# Patient Record
Sex: FEMALE | Race: WHITE | NOT HISPANIC OR LATINO | Employment: FULL TIME | URBAN - METROPOLITAN AREA
[De-identification: names, ages, dates, MRNs, and addresses within clinical notes are randomized per-mention and may not be internally consistent; named-entity substitution may affect disease eponyms.]

---

## 2017-02-21 ENCOUNTER — HISTORICAL (OUTPATIENT)
Dept: LAB | Facility: HOSPITAL | Age: 32
End: 2017-02-21

## 2017-02-21 ENCOUNTER — HISTORICAL (OUTPATIENT)
Dept: PREADMISSION TESTING | Facility: HOSPITAL | Age: 32
End: 2017-02-21

## 2017-02-22 ENCOUNTER — HISTORICAL (OUTPATIENT)
Dept: SURGERY | Facility: HOSPITAL | Age: 32
End: 2017-02-22

## 2018-08-30 ENCOUNTER — HISTORICAL (OUTPATIENT)
Dept: ADMINISTRATIVE | Facility: HOSPITAL | Age: 33
End: 2018-08-30

## 2018-08-30 LAB
ABS NEUT (OLG): 8.5
ALBUMIN SERPL-MCNC: 4.3 GM/DL (ref 3.4–5)
ALBUMIN/GLOB SERPL: 1.39 {RATIO} (ref 1.5–2.5)
ALP SERPL-CCNC: 67 UNIT/L (ref 38–126)
ALT SERPL-CCNC: 16 UNIT/L (ref 7–52)
APPEARANCE, UA: CLEAR
AST SERPL-CCNC: 14 UNIT/L (ref 15–37)
BACTERIA #/AREA URNS AUTO: NORMAL /HPF
BILIRUB SERPL-MCNC: 0.3 MG/DL (ref 0.2–1)
BILIRUB UR QL STRIP: NEGATIVE MG/DL
BILIRUBIN DIRECT+TOT PNL SERPL-MCNC: 0.1 MG/DL (ref 0–0.5)
BILIRUBIN DIRECT+TOT PNL SERPL-MCNC: 0.2 MG/DL
BUN SERPL-MCNC: 10 MG/DL (ref 7–18)
CALCIUM SERPL-MCNC: 9.4 MG/DL (ref 8.5–10)
CHLORIDE SERPL-SCNC: 101 MMOL/L (ref 98–107)
CHOLEST SERPL-MCNC: 263 MG/DL (ref 0–200)
CHOLEST/HDLC SERPL: 5.5 {RATIO}
CO2 SERPL-SCNC: 28 MMOL/L (ref 21–32)
COLOR UR: YELLOW
CREAT SERPL-MCNC: 0.56 MG/DL (ref 0.6–1.3)
ERYTHROCYTE [DISTWIDTH] IN BLOOD BY AUTOMATED COUNT: 14 % (ref 11.5–17)
GLOBULIN SER-MCNC: 3.1 GM/DL (ref 1.2–3)
GLUCOSE (UA): NEGATIVE MG/DL
GLUCOSE SERPL-MCNC: 90 MG/DL (ref 74–106)
HCT VFR BLD AUTO: 39.4 % (ref 37–47)
HDLC SERPL-MCNC: 48 MG/DL (ref 35–60)
HGB BLD-MCNC: 12.7 GM/DL (ref 12–16)
HGB UR QL STRIP: NEGATIVE UNIT/L
KETONES UR QL STRIP: NEGATIVE MG/DL
LDLC SERPL CALC-MCNC: 169 MG/DL (ref 0–129)
LEUKOCYTE ESTERASE UR QL STRIP: NORMAL UNIT/L
LYMPHOCYTES # BLD AUTO: 2.5 X10(3)/MCL (ref 0.6–3.4)
LYMPHOCYTES NFR BLD AUTO: 20.9 % (ref 13–40)
MCH RBC QN AUTO: 29.5 PG (ref 27–31.2)
MCHC RBC AUTO-ENTMCNC: 32 GM/DL (ref 32–36)
MCV RBC AUTO: 91 FL (ref 80–94)
MONOCYTES # BLD AUTO: 0.8 X10(3)/MCL (ref 0–1.8)
MONOCYTES NFR BLD AUTO: 6.5 % (ref 0.1–24)
NEUTROPHILS NFR BLD AUTO: 72.6 % (ref 47–80)
NITRITE UR QL STRIP.AUTO: NEGATIVE
PH UR STRIP: 7 [PH]
PLATELET # BLD AUTO: 493 X10(3)/MCL (ref 130–400)
PMV BLD AUTO: 8.6 FL
POTASSIUM SERPL-SCNC: 4.4 MMOL/L (ref 3.5–5.1)
PROT SERPL-MCNC: 7.4 GM/DL (ref 6.4–8.2)
PROT UR QL STRIP: NEGATIVE MG/DL
RBC # BLD AUTO: 4.31 X10(6)/MCL (ref 4.2–5.4)
RBC #/AREA URNS HPF: NORMAL /HPF
SODIUM SERPL-SCNC: 136 MMOL/L (ref 136–145)
SP GR UR STRIP: 1.01
SQUAMOUS EPITHELIAL, UA: NORMAL /LPF
TRIGL SERPL-MCNC: 270 MG/DL (ref 30–150)
TSH SERPL-ACNC: 1.81 MIU/ML (ref 0.35–4.94)
UROBILINOGEN UR STRIP-ACNC: 0.2 MG/DL
VLDLC SERPL CALC-MCNC: 54 MG/DL
WBC # SPEC AUTO: 11.8 X10(3)/MCL (ref 4.5–11.5)
WBC #/AREA URNS AUTO: NORMAL /[HPF]

## 2020-01-24 LAB
INFLUENZA A ANTIGEN, POC: POSITIVE
INFLUENZA B ANTIGEN, POC: NEGATIVE

## 2020-04-30 ENCOUNTER — HISTORICAL (OUTPATIENT)
Dept: ADMINISTRATIVE | Facility: HOSPITAL | Age: 35
End: 2020-04-30

## 2020-04-30 ENCOUNTER — HISTORICAL (OUTPATIENT)
Dept: LAB | Facility: HOSPITAL | Age: 35
End: 2020-04-30

## 2020-04-30 LAB
ABS NEUT (OLG): 7.1 X10(3)/MCL (ref 2.1–9.2)
ALBUMIN SERPL-MCNC: 3.8 GM/DL (ref 3.5–5)
ALBUMIN/GLOB SERPL: 1.1 RATIO (ref 1.1–2)
ALP SERPL-CCNC: 81 UNIT/L (ref 40–150)
ALT SERPL-CCNC: 18 UNIT/L (ref 0–55)
AST SERPL-CCNC: 18 UNIT/L (ref 5–34)
BILIRUB SERPL-MCNC: 0.3 MG/DL
BILIRUBIN DIRECT+TOT PNL SERPL-MCNC: 0.1 MG/DL (ref 0–0.5)
BILIRUBIN DIRECT+TOT PNL SERPL-MCNC: 0.2 MG/DL (ref 0–0.8)
BUN SERPL-MCNC: 8 MG/DL (ref 7–18.7)
CALCIUM SERPL-MCNC: 9.5 MG/DL (ref 8.4–10.2)
CHLORIDE SERPL-SCNC: 100 MMOL/L (ref 98–107)
CHOLEST SERPL-MCNC: 288 MG/DL
CHOLEST/HDLC SERPL: 7 {RATIO} (ref 0–5)
CO2 SERPL-SCNC: 29 MMOL/L (ref 22–29)
CREAT SERPL-MCNC: 0.7 MG/DL (ref 0.55–1.02)
ERYTHROCYTE [DISTWIDTH] IN BLOOD BY AUTOMATED COUNT: 13.8 % (ref 11.5–17)
GLOBULIN SER-MCNC: 3.4 GM/DL (ref 2.4–3.5)
GLUCOSE SERPL-MCNC: 86 MG/DL (ref 74–100)
HCT VFR BLD AUTO: 40 % (ref 37–47)
HDLC SERPL-MCNC: 40 MG/DL (ref 35–60)
HGB BLD-MCNC: 12.9 GM/DL (ref 12–16)
LDLC SERPL CALC-MCNC: 199 MG/DL (ref 50–140)
LYMPHOCYTES # BLD AUTO: 2.5 X10(3)/MCL (ref 0.6–3.4)
LYMPHOCYTES NFR BLD AUTO: 23.3 % (ref 13–40)
MCH RBC QN AUTO: 28.6 PG (ref 27–31.2)
MCHC RBC AUTO-ENTMCNC: 32 GM/DL (ref 32–36)
MCV RBC AUTO: 89 FL (ref 80–94)
MONOCYTES # BLD AUTO: 1.1 X10(3)/MCL (ref 0.1–1.3)
MONOCYTES NFR BLD AUTO: 10.6 % (ref 0.1–24)
NEUTROPHILS NFR BLD AUTO: 66.1 % (ref 47–80)
PLATELET # BLD AUTO: 536 X10(3)/MCL (ref 130–400)
PMV BLD AUTO: 9.1 FL (ref 9.4–12.4)
POTASSIUM SERPL-SCNC: 4.6 MMOL/L (ref 3.5–5.1)
PROT SERPL-MCNC: 7.2 GM/DL (ref 6.4–8.3)
RBC # BLD AUTO: 4.51 X10(6)/MCL (ref 4.2–5.4)
SODIUM SERPL-SCNC: 136 MMOL/L (ref 136–145)
TRIGL SERPL-MCNC: 243 MG/DL (ref 37–140)
TSH SERPL-ACNC: 1.33 MIU/ML (ref 0.35–4.94)
VLDLC SERPL CALC-MCNC: 49 MG/DL
WBC # SPEC AUTO: 10.7 X10(3)/MCL (ref 4.5–11.5)

## 2021-01-07 ENCOUNTER — HISTORICAL (OUTPATIENT)
Dept: ADMINISTRATIVE | Facility: HOSPITAL | Age: 36
End: 2021-01-07

## 2021-01-07 LAB
ABS NEUT (OLG): 7.3 X10(3)/MCL (ref 2.1–9.2)
ALBUMIN SERPL-MCNC: 4.4 GM/DL (ref 3.4–5)
ALBUMIN/GLOB SERPL: 1.69 {RATIO} (ref 1.5–2.5)
ALP SERPL-CCNC: 66 UNIT/L (ref 38–126)
ALT SERPL-CCNC: 13 UNIT/L (ref 7–52)
APPEARANCE, UA: CLEAR
AST SERPL-CCNC: 12 UNIT/L (ref 15–37)
BACTERIA #/AREA URNS AUTO: NORMAL /HPF
BILIRUB SERPL-MCNC: 0.3 MG/DL (ref 0.2–1)
BILIRUB UR QL STRIP: NEGATIVE MG/DL
BILIRUBIN DIRECT+TOT PNL SERPL-MCNC: 0.1 MG/DL (ref 0–0.5)
BILIRUBIN DIRECT+TOT PNL SERPL-MCNC: 0.2 MG/DL
BUN SERPL-MCNC: 10 MG/DL (ref 7–18)
CALCIUM SERPL-MCNC: 9.6 MG/DL (ref 8.5–10)
CHLORIDE SERPL-SCNC: 102 MMOL/L (ref 98–107)
CHOLEST SERPL-MCNC: 253 MG/DL (ref 0–200)
CHOLEST/HDLC SERPL: 6.3 {RATIO}
CO2 SERPL-SCNC: 27 MMOL/L (ref 21–32)
COLOR UR: YELLOW
CREAT SERPL-MCNC: 0.71 MG/DL (ref 0.6–1.3)
ERYTHROCYTE [DISTWIDTH] IN BLOOD BY AUTOMATED COUNT: 13.8 % (ref 11.5–17)
GLOBULIN SER-MCNC: 2.6 GM/DL (ref 1.2–3)
GLUCOSE (UA): NEGATIVE MG/DL
GLUCOSE SERPL-MCNC: 91 MG/DL (ref 74–106)
HCT VFR BLD AUTO: 39.8 % (ref 37–47)
HDLC SERPL-MCNC: 40 MG/DL (ref 35–60)
HGB BLD-MCNC: 13 GM/DL (ref 12–16)
HGB UR QL STRIP: NEGATIVE UNIT/L
KETONES UR QL STRIP: NEGATIVE MG/DL
LDLC SERPL CALC-MCNC: 201 MG/DL (ref 0–129)
LEUKOCYTE ESTERASE UR QL STRIP: NEGATIVE UNIT/L
LYMPHOCYTES # BLD AUTO: 2.1 X10(3)/MCL (ref 0.6–3.4)
LYMPHOCYTES NFR BLD AUTO: 20.9 % (ref 13–40)
MCH RBC QN AUTO: 28.8 PG (ref 27–31.2)
MCHC RBC AUTO-ENTMCNC: 33 GM/DL (ref 32–36)
MCV RBC AUTO: 88 FL (ref 80–94)
MONOCYTES # BLD AUTO: 0.8 X10(3)/MCL (ref 0.1–1.3)
MONOCYTES NFR BLD AUTO: 7.7 % (ref 0.1–24)
NEUTROPHILS NFR BLD AUTO: 71.4 % (ref 47–80)
NITRITE UR QL STRIP.AUTO: NEGATIVE
PH UR STRIP: 7 [PH]
PLATELET # BLD AUTO: 533 X10(3)/MCL (ref 130–400)
PMV BLD AUTO: 9 FL (ref 9.4–12.4)
POTASSIUM SERPL-SCNC: 4.1 MMOL/L (ref 3.5–5.1)
PROT SERPL-MCNC: 7 GM/DL (ref 6.4–8.2)
PROT UR QL STRIP: NEGATIVE MG/DL
RBC # BLD AUTO: 4.52 X10(6)/MCL (ref 4.2–5.4)
RBC #/AREA URNS HPF: NORMAL /HPF
SODIUM SERPL-SCNC: 138 MMOL/L (ref 136–145)
SP GR UR STRIP: 1.02
SQUAMOUS EPITHELIAL, UA: NORMAL /LPF
TRIGL SERPL-MCNC: 163 MG/DL (ref 30–150)
TSH SERPL-ACNC: 0.82 MIU/ML (ref 0.35–4.94)
UROBILINOGEN UR STRIP-ACNC: 0.2 MG/DL
VLDLC SERPL CALC-MCNC: 32.6 MG/DL
WBC # SPEC AUTO: 10.2 X10(3)/MCL (ref 4.5–11.5)
WBC #/AREA URNS AUTO: NORMAL /[HPF]

## 2021-11-20 ENCOUNTER — HISTORICAL (OUTPATIENT)
Dept: ADMINISTRATIVE | Facility: HOSPITAL | Age: 36
End: 2021-11-20

## 2021-11-20 LAB
ALBUMIN SERPL-MCNC: 3.4 GM/DL (ref 3.5–5)
ALBUMIN/GLOB SERPL: 1.2 RATIO (ref 1.1–2)
ALP SERPL-CCNC: 68 UNIT/L (ref 40–150)
ALT SERPL-CCNC: 24 UNIT/L (ref 0–55)
AMPHET UR QL SCN: POSITIVE
APPEARANCE, UA: CLEAR
AST SERPL-CCNC: 19 UNIT/L (ref 5–34)
B-HCG SERPL QL: NEGATIVE
BACTERIA SPEC CULT: ABNORMAL
BARBITURATE SCN PRESENT UR: NEGATIVE
BENZODIAZ UR QL SCN: NEGATIVE
BILIRUB SERPL-MCNC: 0.2 MG/DL
BILIRUB UR QL STRIP: NEGATIVE
BILIRUBIN DIRECT+TOT PNL SERPL-MCNC: <0.1 MG/DL (ref 0–0.5)
BILIRUBIN DIRECT+TOT PNL SERPL-MCNC: >0.1 MG/DL (ref 0–0.8)
BUN SERPL-MCNC: 10.4 MG/DL (ref 7–18.7)
CALCIUM SERPL-MCNC: 9.3 MG/DL (ref 8.7–10.5)
CANNABINOIDS UR QL SCN: NEGATIVE NG/ML
CHLORIDE SERPL-SCNC: 107 MMOL/L (ref 98–107)
CHOLEST SERPL-MCNC: 215 MG/DL
CHOLEST/HDLC SERPL: 5 {RATIO} (ref 0–5)
CO2 SERPL-SCNC: 25 MMOL/L (ref 22–29)
COCAINE UR QL SCN: NEGATIVE
COLOR UR: YELLOW
CREAT SERPL-MCNC: 0.72 MG/DL (ref 0.55–1.02)
ERYTHROCYTE [DISTWIDTH] IN BLOOD BY AUTOMATED COUNT: 14.1 % (ref 11.5–17)
GLOBULIN SER-MCNC: 2.9 GM/DL (ref 2.4–3.5)
GLUCOSE (UA): NEGATIVE
GLUCOSE SERPL-MCNC: 88 MG/DL (ref 74–100)
HCT VFR BLD AUTO: 38 % (ref 37–47)
HDLC SERPL-MCNC: 43 MG/DL (ref 35–60)
HGB BLD-MCNC: 12 GM/DL (ref 12–16)
HGB UR QL STRIP: NEGATIVE
KETONES UR QL STRIP: NEGATIVE
LDLC SERPL CALC-MCNC: 151 MG/DL (ref 50–140)
LEUKOCYTE ESTERASE UR QL STRIP: NEGATIVE
MCH RBC QN AUTO: 28.8 PG (ref 27–31)
MCHC RBC AUTO-ENTMCNC: 31.6 GM/DL (ref 33–36)
MCV RBC AUTO: 91.1 FL (ref 80–94)
NITRITE UR QL STRIP: NEGATIVE
OPIATES UR QL SCN: NEGATIVE
PCP UR QL: NEGATIVE
PH UR STRIP.AUTO: 6 [PH] (ref 5–7.5)
PH UR STRIP: 6 [PH] (ref 5–9)
PLATELET # BLD AUTO: 440 X10(3)/MCL (ref 130–400)
PMV BLD AUTO: 9.7 FL (ref 9.4–12.4)
POTASSIUM SERPL-SCNC: 4.8 MMOL/L (ref 3.5–5.1)
PROT SERPL-MCNC: 6.3 GM/DL (ref 6.4–8.3)
PROT UR QL STRIP: NEGATIVE
RBC # BLD AUTO: 4.17 X10(6)/MCL (ref 4.2–5.4)
RBC #/AREA URNS HPF: ABNORMAL /[HPF]
SODIUM SERPL-SCNC: 141 MMOL/L (ref 136–145)
SP GR FLD REFRACTOMETRY: 1.02 (ref 1–1.03)
SP GR UR STRIP: 1.02 (ref 1–1.03)
SQUAMOUS EPITHELIAL, UA: ABNORMAL
TRIGL SERPL-MCNC: 103 MG/DL (ref 37–140)
TSH SERPL-ACNC: 1.38 UIU/ML (ref 0.35–4.94)
UROBILINOGEN UR STRIP-ACNC: 0.2
VLDLC SERPL CALC-MCNC: 21 MG/DL
WBC # SPEC AUTO: 8.5 X10(3)/MCL (ref 4.5–11.5)
WBC #/AREA URNS HPF: ABNORMAL /HPF

## 2022-04-10 ENCOUNTER — HISTORICAL (OUTPATIENT)
Dept: ADMINISTRATIVE | Facility: HOSPITAL | Age: 37
End: 2022-04-10
Payer: COMMERCIAL

## 2022-04-20 ENCOUNTER — OFFICE VISIT (OUTPATIENT)
Dept: INTERNAL MEDICINE | Facility: CLINIC | Age: 37
End: 2022-04-20
Payer: COMMERCIAL

## 2022-04-20 VITALS
SYSTOLIC BLOOD PRESSURE: 124 MMHG | HEIGHT: 64 IN | DIASTOLIC BLOOD PRESSURE: 82 MMHG | BODY MASS INDEX: 34.06 KG/M2 | OXYGEN SATURATION: 97 % | TEMPERATURE: 99 F | WEIGHT: 199.5 LBS | HEART RATE: 108 BPM

## 2022-04-20 DIAGNOSIS — E66.9 OBESITY (BMI 30-39.9): ICD-10-CM

## 2022-04-20 DIAGNOSIS — F90.9 ATTENTION DEFICIT HYPERACTIVITY DISORDER (ADHD), UNSPECIFIED ADHD TYPE: ICD-10-CM

## 2022-04-20 DIAGNOSIS — D22.9 CHANGE IN SKIN MOLE: ICD-10-CM

## 2022-04-20 DIAGNOSIS — F32.9 MAJOR DEPRESSIVE DISORDER WITH SINGLE EPISODE, REMISSION STATUS UNSPECIFIED: ICD-10-CM

## 2022-04-20 DIAGNOSIS — F41.1 ANXIETY STATE: Primary | ICD-10-CM

## 2022-04-20 PROBLEM — U07.1 COVID-19: Status: ACTIVE | Noted: 2022-01-17

## 2022-04-20 PROCEDURE — 99999 PR PBB SHADOW E&M-EST. PATIENT-LVL IV: ICD-10-PCS | Mod: PBBFAC,,,

## 2022-04-20 PROCEDURE — 99204 PR OFFICE/OUTPT VISIT, NEW, LEVL IV, 45-59 MIN: ICD-10-PCS | Mod: S$GLB,,,

## 2022-04-20 PROCEDURE — 99999 PR PBB SHADOW E&M-EST. PATIENT-LVL IV: CPT | Mod: PBBFAC,,,

## 2022-04-20 PROCEDURE — 99204 OFFICE O/P NEW MOD 45 MIN: CPT | Mod: S$GLB,,,

## 2022-04-20 RX ORDER — ESCITALOPRAM OXALATE 10 MG/1
10 TABLET ORAL DAILY
COMMUNITY
Start: 2022-03-16 | End: 2022-04-20

## 2022-04-20 RX ORDER — ESCITALOPRAM OXALATE 10 MG/1
10 TABLET ORAL DAILY
Qty: 90 TABLET | Refills: 2 | Status: SHIPPED | OUTPATIENT
Start: 2022-04-20 | End: 2022-05-09 | Stop reason: SDUPTHER

## 2022-04-20 RX ORDER — OXCARBAZEPINE 300 MG/1
300 TABLET, FILM COATED ORAL 2 TIMES DAILY
Qty: 60 TABLET | Refills: 6 | Status: SHIPPED | OUTPATIENT
Start: 2022-04-20 | End: 2022-05-09 | Stop reason: SDUPTHER

## 2022-04-20 RX ORDER — FLUTICASONE PROPIONATE 50 MCG
SPRAY, SUSPENSION (ML) NASAL
COMMUNITY
Start: 2022-01-17 | End: 2022-05-09

## 2022-04-20 RX ORDER — DEXTROAMPHETAMINE SACCHARATE, AMPHETAMINE ASPARTATE, DEXTROAMPHETAMINE SULFATE AND AMPHETAMINE SULFATE 5; 5; 5; 5 MG/1; MG/1; MG/1; MG/1
1 TABLET ORAL 2 TIMES DAILY
COMMUNITY
Start: 2021-12-09 | End: 2022-04-20

## 2022-04-20 RX ORDER — OXCARBAZEPINE 300 MG/1
300 TABLET, FILM COATED ORAL 2 TIMES DAILY
COMMUNITY
Start: 2022-03-16 | End: 2022-04-20 | Stop reason: SDUPTHER

## 2022-04-20 NOTE — PROGRESS NOTES
Leyla Brock  04/20/2022  [id number]    No primary care provider on file.  No care team member to display  Has the patient seen any provider outside of the [facility] network since the last visit? (yes). If yes, HIPPA forms completed and records requested.        Visit Type:an urgent visit for a new problem    Chief Complaint:  Chief Complaint   Patient presents with    Medication Refill       History of Present Illness:    Recently moved to  from [place]  Her wife recently got a new job in      ADHD  Was on adderall   Did not like the effects  Last med refill was 12/2021  Was seeing Sydnie Haile,  for med management   At this time does not think she needs to continue meds for ADHD       Was on Wellbutrin and adderall together  Not sure if the combination of the drugs were making her fell off    Taking lexapro and trileptal   Anxiety and depression are well controlled  Denies SI or HI thoughts  Was seeing Lyric camryn for med management          History:  No past medical history on file.  No past surgical history on file.  No family history on file.  Social History     Socioeconomic History    Marital status:      There is no problem list on file for this patient.    Review of patient's allergies indicates:  Not on File    The following were reviewed at this visit: active problem list, medication list, allergies, family history, social history, and health maintenance.    Medications:  No current outpatient medications on file prior to visit.     No current facility-administered medications on file prior to visit.       Medications have been reviewed and reconciled with patient at this visit.  Barriers to medications reviewed with patient.    Adverse reactions to current medications reviewed with patient..    Over the counter medications reviewed and reconciled with patient.    Exam:  Wt Readings from Last 3 Encounters:   No data found for Wt     Temp Readings from Last 3 Encounters:   No data  found for Temp     BP Readings from Last 3 Encounters:   No data found for BP     Pulse Readings from Last 3 Encounters:   No data found for Pulse     There is no height or weight on file to calculate BMI.      Review of Systems   Constitutional: Negative.  Negative for chills and fever.   HENT: Negative.  Negative for congestion, sinus pain and sore throat.    Eyes: Negative for blurred vision and double vision.   Respiratory: Negative for cough, sputum production, shortness of breath and wheezing.    Cardiovascular: Negative for chest pain, palpitations and leg swelling.   Gastrointestinal: Negative for abdominal pain, constipation, diarrhea, heartburn, nausea and vomiting.   Genitourinary: Negative.    Musculoskeletal: Negative.    Skin: Negative.  Negative for rash.   Neurological: Negative.    Endo/Heme/Allergies: Negative.  Negative for polydipsia. Does not bruise/bleed easily.   Psychiatric/Behavioral: Negative for depression and substance abuse.     Physical Exam  Vitals and nursing note reviewed.   Constitutional:       General: She is not in acute distress.     Appearance: She is well-developed. She is obese. She is not diaphoretic.   HENT:      Head: Normocephalic and atraumatic.      Right Ear: External ear normal.      Left Ear: External ear normal.      Nose: Nose normal.   Eyes:      General:         Right eye: No discharge.         Left eye: No discharge.      Conjunctiva/sclera: Conjunctivae normal.      Pupils: Pupils are equal, round, and reactive to light.   Neck:      Thyroid: No thyromegaly.   Cardiovascular:      Rate and Rhythm: Normal rate and regular rhythm.      Pulses: Normal pulses.      Heart sounds: Normal heart sounds. No murmur heard.  Pulmonary:      Effort: Pulmonary effort is normal. No respiratory distress.      Breath sounds: Normal breath sounds. No wheezing.   Abdominal:      General: Bowel sounds are normal.   Musculoskeletal:         General: Normal range of motion.       Cervical back: Normal range of motion and neck supple.   Lymphadenopathy:      Cervical: No cervical adenopathy.   Skin:     General: Skin is warm and dry.      Capillary Refill: Capillary refill takes less than 2 seconds.   Neurological:      Mental Status: She is alert and oriented to person, place, and time.      Cranial Nerves: No cranial nerve deficit.   Psychiatric:         Behavior: Behavior normal.         Thought Content: Thought content normal.         Judgment: Judgment normal.         Laboratory Reviewed ({N/A)  No results found for: WBC, HGB, HCT, PLT, CHOL, TRIG, HDL, LDLDIRECT, ALT, AST, NA, K, CL, CREATININE, BUN, CO2, TSH, PSA, INR, GLUF, HGBA1C, MICROALBUR    Leyla was seen today for medication refill.    Diagnoses and all orders for this visit:    Anxiety state  -     EScitalopram oxalate (LEXAPRO) 10 MG tablet; Take 1 tablet (10 mg total) by mouth once daily.  -     OXcarbazepine (TRILEPTAL) 300 MG Tab; Take 1 tablet (300 mg total) by mouth 2 (two) times daily.    Change in skin mole  -     Ambulatory referral/consult to Dermatology; Future    Major depressive disorder with single episode, remission status unspecified  -     EScitalopram oxalate (LEXAPRO) 10 MG tablet; Take 1 tablet (10 mg total) by mouth once daily.  -     OXcarbazepine (TRILEPTAL) 300 MG Tab; Take 1 tablet (300 mg total) by mouth 2 (two) times daily.    Attention deficit hyperactivity disorder (ADHD), unspecified ADHD type   Currently stable at this time  Obesity (BMI 30-39.9)    Will schedule an annual/est care appt with Dr. Cruz        Care Plan/Goals: Reviewed    Goals    None         Follow up: No follow-ups on file.    After visit summary was printed and given to patient upon discharge today.  Patient goals and care plan are included in After Visit Summary.

## 2022-04-25 VITALS
DIASTOLIC BLOOD PRESSURE: 81 MMHG | OXYGEN SATURATION: 99 % | BODY MASS INDEX: 31.27 KG/M2 | HEIGHT: 64 IN | SYSTOLIC BLOOD PRESSURE: 119 MMHG | WEIGHT: 183.19 LBS

## 2022-05-03 NOTE — HISTORICAL [facility]ER
This is a historical note converted from [employer]. Formatting and pictures may have been removed.  Please reference Shira for original formatting and attached multimedia. Chief Complaint  wellness  History of Present Illness  The patient is a 32 year old white female here today for a complete Wellness physical.? The patient?has?1 acute complaints today. The patient also carries a diagnosis of?mood disorder NOS/tobacco use/carbuncles, which is assessed today.? Exercise is reported as minimal and includes?work activities.?? Diet modifications are reported as, none.?? Previous documented weight is recorded? as previous.  The patient also reports taking her venlafaxine 100% compliance?but is requesting a elevated dose secondary to some breakthrough symptoms?of dysthymia/anxiety.? No suicidal or homicidal ideations.? She is concerned about her weight?and?is attempting to lose weight through lifestyle modifications and exercise.? 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 She also complains of some?boils near her?waistline and in her bilateral inguinal region.  Review of Systems  Constitutional:?no weight gain,?no weight loss,?no fatigue,?no fever,?no chills,?no weakness,?no trouble sleeping.  Eyes:?no vision loss/changes,?no glasses or contacts,?no pain,?no redness,?no blurry or double vision,?no flashing lights,?no specks,?no glaucoma,?no cataracts.  Last eye exam:?within last 6 months  Head:?no headache,?no head injury,?no neck pain.?  Neck:??no lumps,?no swollen glands,?no stiffness.  Ears:?no decreased hearing,?no ringing,?no earache,?no drainage.?  Nose:?no stuffiness,?no discharge,?no itching,?no hay fever,?no nosebleeds,?no sinus pain.  Throat:?no bleeding,?no dentures,?no sore tongue,?no dry mouth,?no sore throat,?no hoarseness,?no thrush,?no non-healing sores.  Cardiovascular:?no chest pain or discomfort,?no tightness,?no palpitations,?no SOB with  activity,?no difficulty breathing while supine,?no swelling,?no sudden awakening from sleep with SOB.  Vascular:?no calf pain with walking,?no leg cramping.  Respiratory:??no cough,?no sputum,?no coughing up blood,?no SOB,?no wheezing,?no painful breathing.  Gastrointestinal:?no swallowing difficulty,?no heartburn,?no change in appetite,?no nausea,?no change in bowel habits,?no rectal bleeding,?no constipation,?no diarrhea,?no yellow eyes or skin.  Urinary:?no frequency,?no urgency,?no burning or pain,?no blood in urine,?no incontinence,?no change in urinary strength.  Musculoskeletal:?no muscle or joint pain,?no stiffness,?no back pain,?no redness of joints,?no swelling of joints,?no trauma.  Skin:?rashes,?lumps,?no itching,?no dryness,?color normal for ethnicity,?no hair or nail changes.? Primarily near the waistline and bilateral inguinal region  Neurologic:?no dizziness,?no fainting,?no seizures,?no weakness,?no numbness,?no tingling,?no tremors.  Psychiatric:?nervousness,?stress,?no depression,?no memory loss.  Endocrine:?no heat or cold intolerance,?no sweating,?no frequent urination,?no thirst,?no change in appetite.  Hematologic:?no ease of bruising,?no ease of bleeding.  ?  ?  ?  ?  Physical Exam  Vitals & Measurements  BP:?120/76?  HT:?163?cm? HT:?163?cm? WT:?94.7?kg? WT:?94.7?kg? BMI:?35.64?  VITAL SIGNS:? Reviewed.? ?  GENERAL:? In?no apparent distress.? Alert and Oriented x3  HEAD:?No signsof head trauma. Normocephalic  EYES:? Pupils?equal/round/reactive.? Extraocular motionsintact.  EARS:? Hearing?grossly intact. TMs and EAC?clear  MOUTH:??Oropharynx is clear.?No erythema. No exudates  NECK:? No LAD. No JVD. No thyromegaly. No bruits  CHEST:? Chest with clear breath sounds bilaterally.? No wheezes, rales, or rhonchi. Good air movement  CARDIAC:? Regular rate and rhythm.? S1 and S2, without murmurs, gallops, or rubs.  VASCULAR:??No Edema.? Peripheral pulses normal and equal in all  extremities.  ABDOMEN:?Soft, without detectable tenderness.??No sign of distention.?No rebound or guarding, and no masses palpated.? ?Bowel Sounds present and normal x 4.  MUSCULOSKELETAL:??Good range of motion of all major joints.?5/5 strength throughout. Extremities without clubbing, cyanosis or edema.  NEUROLOGIC EXAM:? Alert and oriented x 3.? No focal sensory or strength deficits.? ?Speech normal.? Follows commands.  PSYCHIATRIC:? Mood normal.? Linear/lucid/mildly anxious/normal affect/no internal stimuli response/MMSE essentially normal.  SKIN:??No rash or lesions.? Multiple isolated areas of folliculitis with carbuncles. ?No fluctuance. ?No discharge.  Assessment/Plan  1.?Wellness examination  ?Assessment/Plan:  ?   1.?Wellness  -Health and Exercise Prescription issued and educated upon  -10% weight loss goal  -Lifestyle counseling >20minutes  -Diet:?Low-carb/low volume  -Screening:?Up-to-date/GYN provider?has her up-to-date for breast/Pap  -Vaccines:?Tdap vaccine today/patient defers flu vaccine after discussion-wrist benefits discussed  -Labs:?See below  ?   2.Comorbidities:?See below  ?   3. Referrals:?None  ?   4. RTC:?12 month wellness  ?  Ordered:  CBC w/ Auto Diff, Routine collect, 08/30/18 9:51:00 CDT, Blood, Order for future visit, Stop date 08/30/18 9:51:00 CDT, Lab Collect, Wellness examination, 08/30/18 9:51:00 CDT  Clinic Follow up, *Est. 08/30/19 3:00:00 CDT, Wellness, Order for future visit, Wellness examination, [facility]  Comprehensive Metabolic Panel, Routine collect, 08/30/18 9:51:00 CDT, Blood, Order for future visit, Stop date 08/30/18 9:51:00 CDT, Lab Collect, Wellness examination, 08/30/18 9:51:00 CDT  Lab Collection Request, 08/30/18 9:51:00 CDT, [facility] AMB - [facility], 08/30/18 9:51:00 CDT  Lipid Panel, Routine collect, *Est. 08/30/18 3:00:00 CDT, Blood, Order for future visit, *Est. Stop date 08/30/18 3:00:00 CDT, Lab Collect, Wellness examination, 08/30/18 9:51:00 CDT  Preventative Health  Care Est 18-39 years [id number] PC, Wellness examination, INK AMB - [facility], 08/30/18 9:51:00 CDT  Thyroid Stimulating Hormone, Routine collect, 08/30/18 9:51:00 CDT, Blood, Order for future visit, Stop date 08/30/18 9:51:00 CDT, Lab Collect, Wellness examination, 08/30/18 9:51:00 CDT  Urinalysis Complete no reflex, Routine collect, Urine, Order for future visit, 08/30/18 9:51:00 CDT, Stop date 08/30/18 9:51:00 CDT, Nurse collect, Wellness examination  ?  2.?Mild mood disorder  ?-long discussion regarding?mood/no suicidal or homicidal ideations-but breakthrough on some?dysthymia and anxiety  -Increase venlafaxine to 75 mg extended release daily #30 and 11 refills  -The patient will call us with efficacy and/or side effects  -ER precautions for any suicidal or homicidal ideations-none current or in the past  -Greater than 50% of office visit spent counseling on?diagnosis/pathophysiology/follow-up/prognosis  -[facility]ocate counselor?once again if indicated-patient will pursue  Ordered:  venlafaxine, 75 mg = 1 cap(s), Oral, Daily, # 30 cap(s), 11 Refill(s), Pharmacy: [facility] [id number]  Clinic Follow up, *Est. 02/28/19 3:00:00 CST, Order for future visit, Mild mood disorder  Tobacco user, ink AFP  ?  3.?Tobacco user  ?-long discussion regarding cessation/patient is a contemplator  -Educated on use of?patches starting with 21 mg then 40 mg and 70 mg?along with?a cinnamon toothpick for all fixation-patient will consider and try a trial  -Greater than 50% of this time spent counseling on disease pathology/treatment  Ordered:  Clinic Follow up, *Est. 02/28/19 3:00:00 CST, Order for future visit, Mild mood disorder  Tobacco user, ink AFP  Smoke-Tobacco Cnsl 3-10M - Stat [id number] PC, Tobacco user, [facility] AMB - [facility], 08/30/18 9:53:00 CDT  ?  4.?Carbuncle  ?-Hibiclens to the area 1-2 times a day  -Mupirocin twice daily ?10 days upon flare  -No indication for I&D today  Ordered:  mupirocin topical, 1 gwen, TOP, BID, X 10 day(s), #  22 gm, 1 Refill(s), Pharmacy: [id number]  ?  Need for tetanus booster  ?-Tdap today  Ordered:  tetanus/diphth/pertuss (Tdap) adult/adol, 0.5 mL, IM, Once-Unscheduled, first dose 08/30/18 9:51:00 CDT  ?   Problem List/Past Medical History  Ongoing  Cyst - pilonidal  Hyperlipidemia  Mild mood disorder  Obesity  Tobacco user  Historical  Anxiety  Depression  Procedure/Surgical History  Excision of Buttock Subcutaneous Tissue and Fascia, Open Approach (02/22/2017)  Excision of pilonidal cyst or sinus; extensive (02/22/2017)  Pilonidal Cystectomy (None) (02/22/2017)  Esophagogastrectomy (2012)  BREAST REDUCTION  Pap smear for cervical cancer screening 27-APR-2016 23:33:46<$>  Tonsillectomy and adenoidectomy  wisdom teeth removed   Medications  mupirocin 2% topical ointment, 1 gwen, TOP, BID, 1 refills  venlafaxine 75 mg oral capsule, extended release, 75 mg= 1 cap(s), Oral, Daily, 11 refills  Allergies  No Known Allergies  Social History  Alcohol  Current, 03/27/2018  Current, Beer, 12/18/2015  Substance Abuse  Never, 12/18/2015  Tobacco  Current every day smoker, 03/27/2018  Current every day smoker, Cigarettes, 12/18/2015  Family History  CAD (coronary artery disease): Mother.  Hypertension.: Mother.  Immunizations  Vaccine Date Status   tetanus/diphtheria/pertussis, acel(Tdap) 08/30/2018 Given   Health Maintenance  Health Maintenance  ???Pending?(in the next year)  ??? ??OverDue  ??? ? ? ?Depression Screening due??06/07/17??and every 1??year(s)  ??? ? ? ?Hypertension Management-BMP due??02/21/18??and every 1??year(s)  ??? ??Due?  ??? ? ? ?Alcohol Misuse Screening due??08/30/18??and every 1??year(s)  ??? ? ? ?Diabetes Screening due??08/30/18??and every?  ??? ? ? ?Influenza Vaccine due??08/30/18??and every?  ??? ??Due In Future?  ??? ? ? ?Smoking Cessation not due until??03/27/19??and every 1??year(s)  ???Satisfied?(in the past 1 year)  ??? ??Satisfied?  ??? ? ? ?Blood Pressure Screening  on??08/30/18.??Satisfied by Chica Groves  ??? ? ? ?Body Mass Index Check on??08/30/18.??Satisfied by Chica Groves  ??? ? ? ?Cervical Cancer Screening on??11/21/17.??Satisfied by Johanna Tee  ??? ? ? ?Diabetes Screening on??08/30/18.??Satisfied by Jude Latham MD  ??? ? ? ?Hypertension Management-Blood Pressure on??08/30/18.??Satisfied by Chica Groves  ??? ? ? ?Obesity Screening on??08/30/18.??Satisfied by Chica Groves  ??? ? ? ?Smoking Cessation on??03/27/18.??Satisfied by Cely Leggett  ??? ? ? ?Tetanus Vaccine on??08/30/18.??Satisfied by Chica Groves  ?  ?

## 2022-05-03 NOTE — HISTORICAL [facility]
This is a historical note converted from [employer]. Formatting and pictures may have been removed.  Please reference [employer] for original formatting and attached multimedia. Chief Complaint  wellness  History of Present Illness  The patient is a?35 year old?female here today for a complete Wellness Physical exam. The patient has?no acute complaints today. The patient also carries a diagnosis of mentioned, which is assessed today. Exercise is reported as staying active with the kids. Monitors diet on a daily basis. Previous documented weight at last office visit is?201- she has some weight loss.??She states she was training with a  prior to the kids and has been able to keep the weight off with monitoring her diet and staying so busy.  She did reach out to psych for further ADHD testing and is followed by a psych provider Sydnie Sin and overall has been tolerating well. Since restarting Adderall she has decreased her Effexor to 37.5mg and overall has been tolerating this dose well without any issues or concerns. States mood has been stable and controlled, no depression, no s/ideation.  She has not had a recent pap in several years and plans to contact Dr. Edwards office to get apt made.- I enc the importance of this.  She is also currently still smoking,?she has cut back and is now at a half a pack per day?and defers?medication to help?with quitting at this time, I congratulated her on her decrease of use and encouraged her to continue?until she is?completely?tobacco-free  ?   GABBY [address] GYN  Review of Systems  Constitutional:?no weight gain,?weight loss,?no fatigue,?no fever,?no chills,?no weakness,?no trouble sleeping.  Eyes:?no vision loss/changes,?glasses or contacts,?no pain,?no redness,?no blurry or double vision,?no flashing lights,?no specks,?no glaucoma,?no cataracts.  Last eye exam:?within last year  Head:?no headache,?no head injury,?no neck pain.?  Neck:??no  lumps,?no swollen glands,?no stiffness.  Ears:?no decreased hearing,?no ringing,?no earache,?no drainage.?  Nose:?no stuffiness,?no discharge,?no itching,?no hay fever,?no nosebleeds,?no sinus pain.  Throat:?no bleeding,?no dentures,?no sore tongue,?no dry mouth,?no sore throat,?no hoarseness,?no thrush,?no non-healing sores.  Cardiovascular:?no chest pain or discomfort,?no tightness,?no palpitations,?no SOB with activity,?no difficulty breathing while supine,?no swelling,?no sudden awakening from sleep with SOB.  Vascular:?no calf pain with walking,?no leg cramping.  Respiratory:??no cough,?no sputum,?no coughing up blood,?no SOB,?no wheezing,?no painful breathing.  Gastrointestinal:?no swallowing difficulty,?no heartburn,?no change in appetite,?no nausea,?no change in bowel habits,?no rectal bleeding,?no constipation,?no diarrhea,?no yellow eyes or skin.  Urinary:?no frequency,?no urgency,?no burning or pain,?no blood in urine,?no incontinence,?no change in urinary strength.  Musculoskeletal:?no muscle or joint pain,?no stiffness,?no back pain,?no redness of joints,?no swelling of joints,?no trauma.  Skin:?no rashes,?no lumps,?no itching,?no dryness,?color normal for ethnicity,?no hair or nail changes.  Neurologic:?no dizziness,?no fainting,?no seizures,?no weakness,?no numbness,?no tingling,?no tremors.  Psychiatric:?no nervousness,?no stress,?no depression,?no memory loss.- controlled with meds, no s/h ideation  Endocrine:?no heat or cold intolerance,?no sweating,?no frequent urination,?no thirst,?no change in appetite.  Hematologic:?no ease of bruising,?no ease of bleeding.  Physical Exam  Vitals & Measurements  BP:?119/81?  HT:?163?cm? HT:?163.00?cm? WT:?83.1?kg? WT:?83.100?kg? BMI:?31.28?  General- In NAD, A&O x 4  ?   Eye- PERRL, EOMI  ?   HENT- TM/EAC clear, Nose mucosa WNL, No D/C, No Sinus Tenderness, O/P without erythema or exudates?  ?   Neck- S, No LA, No Thyromegaly, No bruits, No JVD  ?    Respiratory- CTA, No wheezing, No crackles, No rhonchi  ?   Cardiovascular- RRR W/O MGR, Pulses equal throughout  ?   Gastrointestinal- S, NT, No HSM, NABS, No masses, No peritoneal signs?  ?   Lymphatics- WNL  ?  Musculoskeletal- No tenderness, Joints WNL, FROM, Neg SLR, No CCE  ?  Integumentary- Warm, dry, intact, No lesions/rashes/hives  ?  Neurologic- No Motor/Sensory deficits, Reflexes +2 throughout, CN II-XII intact, Neg cerebellar tests  Assessment/Plan  1.?Wellness examination?Z00.00  ?1. Wellness  - Health and Exercise educated upon  -10% weight loss goal  -Lifestyle counseling > 20 minutes  -Diet: Healthy choices  -Screening: Strongly enc importance of scheduling pap with GYN provider, UTD eye exam  -Vaccines: Tdap UTD, defers flu vaccine today  -LabS: CBC/CMP/TSH/LIPIDS/UA  ?   2. Comorbidities- mentioned  ?   3. Referrals none at this time  ?   4. RTC in?12 months sooner if needed  ?   [facility] family forms completed and scanned into chart  Ordered:  CBC w/ Auto Diff, Routine collect, 01/07/21 10:43:00 CST, Blood, Order for future visit, Stop date 01/07/21 10:43:00 CST, Lab Collect, Wellness examination, 01/07/21 10:43:00 CST  Clinic Follow up, *Est. 01/07/22 3:00:00 CST, Order for future visit, Wellness examination, [facility]  Clinic Follow-up PRN, 01/07/21 10:43:00 CST, [facility], Future Order  Comprehensive Metabolic Panel, Routine collect, 01/07/21 10:43:00 CST, Blood, Order for future visit, Stop date 01/07/21 10:43:00 CST, Lab Collect, Wellness examination  Hyperlipidemia, 01/07/21 10:43:00 CST  Lab Collection Request, 01/07/21 10:43:00 CST, [facility], 01/07/21 10:43:00 CST, Wellness examination  Lipid Panel, Routine collect, 01/07/21 10:43:00 CST, Blood, Order for future visit, Stop date 01/07/21 10:43:00 CST, Lab Collect, Wellness examination, 01/07/21 10:43:00 CST  Preventative Health Care Est 18-39 years [id number] PC, Wellness examination  ADHD  Hyperlipidemia  Mild mood disorder   Tobacco user, [facility], 01/07/21 10:43:00 CST  Thyroid Stimulating Hormone, Routine collect, 01/07/21 10:43:00 CST, Blood, Order for future visit, Stop date 01/07/21 10:43:00 CST, Lab Collect, Wellness examination, 01/07/21 10:43:00 CST  Urinalysis no Reflex, Routine collect, Urine, Order for future visit, 01/07/21 10:43:00 CST, Stop date 01/07/21 10:43:00 CST, Nurse collect, Wellness examination  ?  2.?ADHD?F90.9  1. Followed by psych provider  Ordered:  Preventative Health Care Est 18-39 years [id number] PC, Wellness examination  ADHD  Hyperlipidemia  Mild mood disorder  Tobacco user, [facility], 01/07/21 10:43:00 CST  ?  3.?Hyperlipidemia?E78.5  1. Continue diet modifications and exercise as tolerated (TLC)  Ordered:  Comprehensive Metabolic Panel, Routine collect, 01/07/21 10:43:00 CST, Blood, Order for future visit, Stop date 01/07/21 10:43:00 CST, Lab Collect, Wellness examination  Hyperlipidemia, 01/07/21 10:43:00 CST  Preventative Health Care Est 18-39 years [address]322 PC, Wellness examination  ADHD  Hyperlipidemia  Mild mood disorder  Tobacco user, [facility], 01/07/21 10:43:00 CST  ?  4.?Mild mood disorder?F39  1. Now followed by psych, stable with current dosing, no s/h ideation  Ordered:  Preventative Health Care Est 18-39 years 94267 PC, Wellness examination  ADHD  Hyperlipidemia  Mild mood disorder  Tobacco user, [facility], 01/07/21 10:43:00 CST  ?  5.?Tobacco user?Z72.0  1. Strongly enc to quit, defers treatment with meds at this time, has decreased to 1/2 ppd, enc to continue to decrease use and set goal to quit  Ordered:  Preventative Health Care Est 18-39 years 98226 PC, Wellness examination  ADHD  Hyperlipidemia  Mild mood disorder  Tobacco user, [facility], 01/07/21 10:43:00 CST  ?  Orders:  venlafaxine, 37.5 mg = 1 cap(s), Oral, Daily, # 30 cap(s), 0 Refill(s), other reason (Rx)  Referrals  Clinic Follow up, *Est. 01/11/22 8:15:00 CST, Order for future visit,  Wellness examination, [facility]  Clinic Follow-up PRN, 01/07/21 10:43:00 CST, [facility], Future Order   Problem List/Past Medical History  Ongoing  ADHD  Cyst - pilonidal  Hyperlipidemia  Mild mood disorder  Obesity  Tobacco user  Historical  Anxiety  Depression  Procedure/Surgical History  Excision of Buttock Subcutaneous Tissue and Fascia, Open Approach (02/22/2017)  Excision of pilonidal cyst or sinus; extensive (02/22/2017)  Pilonidal Cystectomy (None) (02/22/2017)  Esophagogastrectomy (2012)  BREAST REDUCTION  Pap smear for cervical cancer screening 27-APR-2016 23:33:46<$>  Tonsillectomy and adenoidectomy  wisdom teeth removed   Medications  Adderall 20 mg oral tablet, 20 mg= 1 tab(s), Oral, BID  venlafaxine 37.5 mg oral capsule, extended release, 37.5 mg= 1 cap(s), Oral, Daily  Allergies  No Known Allergies  Social History  Abuse/Neglect  No, 01/07/2021  No, 01/24/2020  Alcohol  Current, 03/27/2018  Current, Beer, 12/18/2015  Substance Use  Never, 12/18/2015  Tobacco  4 or less cigarettes(less than 1/4 pack)/day in last 30 days, No, 01/07/2021  10 or more cigarettes (1/2 pack or more)/day in last 30 days, Cigarettes, No, 20 per day., 01/24/2020  Current every day smoker, 03/27/2018  Current every day smoker, Cigarettes, 12/18/2015  Family History  CAD (coronary artery disease): Mother.  Hypertension.: Mother.  Immunizations  Vaccine Date Status   tetanus/diphtheria/pertussis, acel(Tdap) 08/30/2018 Given   Health Maintenance  Health Maintenance  ???Pending?(in the next year)  ??? ??Due?  ??? ? ? ?ADL Screening due??01/07/21??and every 1??year(s)  ??? ??Due In Future?  ??? ? ? ?Depression Screening not due until??04/24/21??and every 1??year(s)  ??? ? ? ?Obesity Screening not due until??01/01/22??and every 1??year(s)  ??? ? ? ?Smoking Cessation not due until??01/01/22??and every 1??year(s)  ??? ? ? ?Alcohol Misuse Screening not due until??01/02/22??and every 1??year(s)  ??? ? ? ?Cervical Cancer Screening  not due until??01/06/22??and every 3??year(s)  ???Satisfied?(in the past 1 year)  ??? ??Satisfied?  ??? ? ? ?Alcohol Misuse Screening on??01/07/21.??Satisfied by Maya Hua NP  ??? ? ? ?Blood Pressure Screening on??01/07/21.??Satisfied by Terry Davila  ??? ? ? ?Body Mass Index Check on??01/07/21.??Satisfied by Terry Davila  ??? ? ? ?Depression Screening on??04/24/20.??Satisfied by Jude Latham MD  ??? ? ? ?Diabetes Screening on??01/07/21.??Satisfied by Geena Ortez  ??? ? ? ?Hypertension Management-BMP on??01/07/21.??Satisfied by Geena Ortez  ??? ? ? ?Influenza Vaccine on??01/07/21.??Satisfied by Terry Davila  ??? ? ? ?Obesity Screening on??01/07/21.??Satisfied by Terry Davila  ??? ? ? ?Smoking Cessation on??01/07/21.??Satisfied by Maya Hua NP  ??? ??Refused?  ??? ? ? ?Influenza Vaccine on??01/07/21.??Recorded by Maya Hua NP??Reason: Patient Refuses  ?      The?physician?is present within?the office with the?nurse practitioner.??The?office visit?documentation and management have been?reviewed and agreed upon.? I will continue to follow?this patient along with?the nurse practitioner?for current and future care.

## 2022-05-05 ENCOUNTER — PATIENT MESSAGE (OUTPATIENT)
Dept: INTERNAL MEDICINE | Facility: CLINIC | Age: 37
End: 2022-05-05
Payer: COMMERCIAL

## 2022-05-06 ENCOUNTER — TELEPHONE (OUTPATIENT)
Dept: DERMATOLOGY | Facility: CLINIC | Age: 37
End: 2022-05-06
Payer: COMMERCIAL

## 2022-05-06 NOTE — TELEPHONE ENCOUNTER
Attempted to call patient in regards to rescheduling patient's appointment on 5/18/22 due to being a [facility] surgery day.  Message left to return call.

## 2022-05-09 ENCOUNTER — OFFICE VISIT (OUTPATIENT)
Dept: DERMATOLOGY | Facility: CLINIC | Age: 37
End: 2022-05-09
Payer: COMMERCIAL

## 2022-05-09 ENCOUNTER — LAB VISIT (OUTPATIENT)
Dept: LAB | Facility: HOSPITAL | Age: 37
End: 2022-05-09
Attending: FAMILY MEDICINE
Payer: COMMERCIAL

## 2022-05-09 ENCOUNTER — OFFICE VISIT (OUTPATIENT)
Dept: INTERNAL MEDICINE | Facility: CLINIC | Age: 37
End: 2022-05-09
Payer: COMMERCIAL

## 2022-05-09 VITALS
BODY MASS INDEX: 34.1 KG/M2 | HEIGHT: 64 IN | WEIGHT: 199.75 LBS | SYSTOLIC BLOOD PRESSURE: 114 MMHG | OXYGEN SATURATION: 97 % | TEMPERATURE: 98 F | HEART RATE: 101 BPM | DIASTOLIC BLOOD PRESSURE: 70 MMHG

## 2022-05-09 DIAGNOSIS — D22.9 MULTIPLE BENIGN NEVI: ICD-10-CM

## 2022-05-09 DIAGNOSIS — Z00.00 ENCOUNTER FOR MEDICAL EXAMINATION TO ESTABLISH CARE: Primary | ICD-10-CM

## 2022-05-09 DIAGNOSIS — L81.4 SOLAR LENTIGO: ICD-10-CM

## 2022-05-09 DIAGNOSIS — Z72.0 TOBACCO USER: ICD-10-CM

## 2022-05-09 DIAGNOSIS — Z00.00 ENCOUNTER FOR MEDICAL EXAMINATION TO ESTABLISH CARE: ICD-10-CM

## 2022-05-09 DIAGNOSIS — D48.5 NEOPLASM OF UNCERTAIN BEHAVIOR OF SKIN: Primary | ICD-10-CM

## 2022-05-09 DIAGNOSIS — D23.9 DERMATOFIBROMA: ICD-10-CM

## 2022-05-09 DIAGNOSIS — F41.1 ANXIETY STATE: ICD-10-CM

## 2022-05-09 DIAGNOSIS — F32.9 MAJOR DEPRESSIVE DISORDER WITH SINGLE EPISODE, REMISSION STATUS UNSPECIFIED: ICD-10-CM

## 2022-05-09 DIAGNOSIS — D22.9 CHANGE IN SKIN MOLE: ICD-10-CM

## 2022-05-09 DIAGNOSIS — Z00.00 ANNUAL PHYSICAL EXAM: ICD-10-CM

## 2022-05-09 DIAGNOSIS — F39 UNSPECIFIED MOOD (AFFECTIVE) DISORDER: ICD-10-CM

## 2022-05-09 PROBLEM — E78.5 HYPERLIPIDEMIA: Status: ACTIVE | Noted: 2022-05-09

## 2022-05-09 PROBLEM — F90.9 ATTENTION DEFICIT HYPERACTIVITY DISORDER (ADHD): Status: ACTIVE | Noted: 2022-05-09

## 2022-05-09 PROBLEM — L05.91 PILONIDAL CYST: Status: ACTIVE | Noted: 2022-05-09

## 2022-05-09 PROBLEM — E66.9 OBESITY: Status: ACTIVE | Noted: 2022-05-09

## 2022-05-09 LAB
ALBUMIN SERPL BCP-MCNC: 3.7 G/DL (ref 3.5–5.2)
ALP SERPL-CCNC: 73 U/L (ref 55–135)
ALT SERPL W/O P-5'-P-CCNC: 21 U/L (ref 10–44)
ANION GAP SERPL CALC-SCNC: 8 MMOL/L (ref 8–16)
AST SERPL-CCNC: 15 U/L (ref 10–40)
BILIRUB SERPL-MCNC: 0.2 MG/DL (ref 0.1–1)
BUN SERPL-MCNC: 8 MG/DL (ref 6–20)
CALCIUM SERPL-MCNC: 9.6 MG/DL (ref 8.7–10.5)
CHLORIDE SERPL-SCNC: 105 MMOL/L (ref 95–110)
CHOLEST SERPL-MCNC: 259 MG/DL (ref 120–199)
CHOLEST/HDLC SERPL: 5.5 {RATIO} (ref 2–5)
CO2 SERPL-SCNC: 27 MMOL/L (ref 23–29)
CREAT SERPL-MCNC: 0.7 MG/DL (ref 0.5–1.4)
EST. GFR  (AFRICAN AMERICAN): >60 ML/MIN/1.73 M^2
EST. GFR  (NON AFRICAN AMERICAN): >60 ML/MIN/1.73 M^2
GLUCOSE SERPL-MCNC: 87 MG/DL (ref 70–110)
HDLC SERPL-MCNC: 47 MG/DL (ref 40–75)
HDLC SERPL: 18.1 % (ref 20–50)
LDLC SERPL CALC-MCNC: 170.6 MG/DL (ref 63–159)
NONHDLC SERPL-MCNC: 212 MG/DL
POTASSIUM SERPL-SCNC: 4.2 MMOL/L (ref 3.5–5.1)
PROT SERPL-MCNC: 7.1 G/DL (ref 6–8.4)
SODIUM SERPL-SCNC: 140 MMOL/L (ref 136–145)
TRIGL SERPL-MCNC: 207 MG/DL (ref 30–150)

## 2022-05-09 PROCEDURE — 99999 PR PBB SHADOW E&M-EST. PATIENT-LVL IV: CPT | Mod: PBBFAC,,, | Performed by: FAMILY MEDICINE

## 2022-05-09 PROCEDURE — 99203 PR OFFICE/OUTPT VISIT, NEW, LEVL III, 30-44 MIN: ICD-10-PCS | Mod: 25,S$GLB,, | Performed by: STUDENT IN AN ORGANIZED HEALTH CARE EDUCATION/TRAINING PROGRAM

## 2022-05-09 PROCEDURE — 99214 PR OFFICE/OUTPT VISIT, EST, LEVL IV, 30-39 MIN: ICD-10-PCS | Mod: S$GLB,,, | Performed by: FAMILY MEDICINE

## 2022-05-09 PROCEDURE — 88342 IMHCHEM/IMCYTCHM 1ST ANTB: CPT | Mod: 59 | Performed by: PATHOLOGY

## 2022-05-09 PROCEDURE — 99203 OFFICE O/P NEW LOW 30 MIN: CPT | Mod: 25,S$GLB,, | Performed by: STUDENT IN AN ORGANIZED HEALTH CARE EDUCATION/TRAINING PROGRAM

## 2022-05-09 PROCEDURE — 86803 HEPATITIS C AB TEST: CPT | Performed by: FAMILY MEDICINE

## 2022-05-09 PROCEDURE — 88305 TISSUE EXAM BY PATHOLOGIST: ICD-10-PCS | Mod: 26,,, | Performed by: PATHOLOGY

## 2022-05-09 PROCEDURE — 99214 OFFICE O/P EST MOD 30 MIN: CPT | Mod: S$GLB,,, | Performed by: FAMILY MEDICINE

## 2022-05-09 PROCEDURE — 88305 TISSUE EXAM BY PATHOLOGIST: CPT | Mod: 59 | Performed by: PATHOLOGY

## 2022-05-09 PROCEDURE — 99999 PR PBB SHADOW E&M-EST. PATIENT-LVL III: CPT | Mod: PBBFAC,,, | Performed by: STUDENT IN AN ORGANIZED HEALTH CARE EDUCATION/TRAINING PROGRAM

## 2022-05-09 PROCEDURE — 11103 TANGNTL BX SKIN EA SEP/ADDL: CPT | Mod: S$GLB,,, | Performed by: STUDENT IN AN ORGANIZED HEALTH CARE EDUCATION/TRAINING PROGRAM

## 2022-05-09 PROCEDURE — 87389 HIV-1 AG W/HIV-1&-2 AB AG IA: CPT | Performed by: FAMILY MEDICINE

## 2022-05-09 PROCEDURE — 99999 PR PBB SHADOW E&M-EST. PATIENT-LVL IV: ICD-10-PCS | Mod: PBBFAC,,, | Performed by: FAMILY MEDICINE

## 2022-05-09 PROCEDURE — 88342 CHG IMMUNOCYTOCHEMISTRY: ICD-10-PCS | Mod: 26,,, | Performed by: PATHOLOGY

## 2022-05-09 PROCEDURE — 11102 PR TANGENTIAL BIOPSY, SKIN, SINGLE LESION: ICD-10-PCS | Mod: S$GLB,,, | Performed by: STUDENT IN AN ORGANIZED HEALTH CARE EDUCATION/TRAINING PROGRAM

## 2022-05-09 PROCEDURE — 80061 LIPID PANEL: CPT | Performed by: FAMILY MEDICINE

## 2022-05-09 PROCEDURE — 11103 PR TANGENTIAL BIOPSY, SKIN, EA ADDTL LESION: ICD-10-PCS | Mod: S$GLB,,, | Performed by: STUDENT IN AN ORGANIZED HEALTH CARE EDUCATION/TRAINING PROGRAM

## 2022-05-09 PROCEDURE — 11102 TANGNTL BX SKIN SINGLE LES: CPT | Mod: S$GLB,,, | Performed by: STUDENT IN AN ORGANIZED HEALTH CARE EDUCATION/TRAINING PROGRAM

## 2022-05-09 PROCEDURE — 88305 TISSUE EXAM BY PATHOLOGIST: CPT | Mod: 26,,, | Performed by: PATHOLOGY

## 2022-05-09 PROCEDURE — 80053 COMPREHEN METABOLIC PANEL: CPT | Performed by: FAMILY MEDICINE

## 2022-05-09 PROCEDURE — 99999 PR PBB SHADOW E&M-EST. PATIENT-LVL III: ICD-10-PCS | Mod: PBBFAC,,, | Performed by: STUDENT IN AN ORGANIZED HEALTH CARE EDUCATION/TRAINING PROGRAM

## 2022-05-09 PROCEDURE — 36415 COLL VENOUS BLD VENIPUNCTURE: CPT | Performed by: FAMILY MEDICINE

## 2022-05-09 PROCEDURE — 88342 IMHCHEM/IMCYTCHM 1ST ANTB: CPT | Mod: 26,,, | Performed by: PATHOLOGY

## 2022-05-09 RX ORDER — ROSUVASTATIN CALCIUM 5 MG/1
5 TABLET, COATED ORAL DAILY
Qty: 90 TABLET | Refills: 3 | Status: SHIPPED | OUTPATIENT
Start: 2022-05-09 | End: 2022-06-02 | Stop reason: SDUPTHER

## 2022-05-09 RX ORDER — ROSUVASTATIN CALCIUM 5 MG/1
TABLET, COATED ORAL
COMMUNITY
Start: 2021-07-26 | End: 2022-05-09 | Stop reason: SDUPTHER

## 2022-05-09 RX ORDER — ESCITALOPRAM OXALATE 10 MG/1
10 TABLET ORAL DAILY
Qty: 90 TABLET | Refills: 2 | Status: SHIPPED | OUTPATIENT
Start: 2022-05-09 | End: 2022-08-25 | Stop reason: SDUPTHER

## 2022-05-09 RX ORDER — OXCARBAZEPINE 300 MG/1
300 TABLET, FILM COATED ORAL 2 TIMES DAILY
Qty: 60 TABLET | Refills: 6 | Status: SHIPPED | OUTPATIENT
Start: 2022-05-09 | End: 2023-03-21 | Stop reason: SDUPTHER

## 2022-05-09 NOTE — PATIENT INSTRUCTIONS
Shave Biopsy Wound Care    Your doctor has performed a shave biopsy today.  A band aid and vaseline ointment has been placed over the site.  This should remain in place for NO LONGER THAN 48 hours.  It is fine to remove the bandaid after 24 hours, if the area is no longer bleeding. It is recommended that you keep the area dry (do not wet)) for the first 24 hours.  After 24 hours, wash the area with warm soap and water and apply Vaseline jelly.  Many patients prefer to use Neosporin or Bacitracin ointment.  This is acceptable; however, know that you can develop an allergy to this medication even if you have used it safely for years.  It is important to keep the area moist.  Letting it dry out and get air slows healing time, and will worsen the scar.        If you notice increasing redness, tenderness, pain, or yellow drainage at the biopsy site, please notify your doctor.  These are signs of an infection.    If your biopsy site is bleeding, apply firm pressure for 15 minutes straight.  Repeat for another 15 minutes, if it is still bleeding.   If the surgical site continues to bleed, then please contact your doctor.      For [facility] users:   You will receive your biopsy results in [facility] as soon as they are available. Please be assured that your physician/provider will review your results and will then determine what further treatment, evaluation, or planning is required. You should be contacted by your physician's/provider's office within 5 business days of receiving your results; If not, please reach out to directly. This is one more way [facility] is putting you first.     [facility] [place] [address]/ [phone] FAX/ [email or web address]

## 2022-05-09 NOTE — PROGRESS NOTES
Leyla Brock  05/09/2022  [id number]    Elda Cruz MD  Patient Care Team:  Elda Cruz MD as PCP - General (Family Medicine)        Visit Type:establish care    Chief Complaint:  Chief Complaint   Patient presents with    Establish Care       History of Present Illness:  36 year old  Saw Fidelina MAYITO  Here to establish care.    ADHD  Was on adderall   Did not like the effects  Last med refill was 12/2021  Was seeing Sydnie Haile,  for med management   At this time does not think she needs to continue meds for ADHD         Was on Wellbutrin and adderall together  Not sure if the combination of the drugs were making her fell off     Taking lexapro and trileptal- she reports had explosive disorder. She reprots that she felt this got worse after her grandmother passed 1 year ago. She feels that her meds control her.  Anxiety and depression are well controlled  Denies SI or HI thoughts  Was seeing Lyric Steffi for med management  Fidelina Refilled her meds.    Health Maintenance Due   Topic Date Due    Hepatitis C Screening  Never done    Pneumococcal Vaccines (Age 0-64) (1 - PCV) Never done    HIV Screening  Never done    COVID-19 Vaccine (3 - Booster for Pfizer series) 01/26/2022     She had breast reduction.  She reports grandmother had breast cancer.     She is concerned with her weight.    Had HLD  Was on statin,  Stopped taking.    History:  Past Medical History:   Diagnosis Date    Anxiety     Depression      Past Surgical History:   Procedure Laterality Date    BREAST SURGERY      CYST REMOVAL      TONSILLECTOMY      WISDOM TOOTH EXTRACTION       Family History   Problem Relation Age of Onset    Hypertension Mother      Social History     Socioeconomic History    Marital status:    Tobacco Use    Smoking status: Current Every Day Smoker     Packs/day: 0.50     Types: Cigarettes    Smokeless tobacco: Never Used   Substance and Sexual Activity    Alcohol use: Not Currently     Drug use: Never     Patient Active Problem List   Diagnosis    Anxiety state    Major depressive disorder with single episode    COVID-19    Attention deficit hyperactivity disorder (ADHD)    Hyperlipidemia    Mild mood disorder    Obesity    Pilonidal cyst    Tobacco user     Review of patient's allergies indicates:  No Known Allergies    The following were reviewed at this visit: active problem list, medication list, allergies, family history, social history, and health maintenance.    Medications:  Current Outpatient Medications on File Prior to Visit   Medication Sig Dispense Refill    EScitalopram oxalate (LEXAPRO) 10 MG tablet Take 1 tablet (10 mg total) by mouth once daily. 90 tablet 2    OXcarbazepine (TRILEPTAL) 300 MG Tab Take 1 tablet (300 mg total) by mouth 2 (two) times daily. 60 tablet 6    [DISCONTINUED] fluticasone propionate (FLONASE) 50 mcg/actuation nasal spray Flonase Allergy Relief Take 1 spray(s) (nasal) 2 times per day for 7 days [id number] spray,suspension 2 times per day nasal 7 days active 50 mcg/actuation      [DISCONTINUED] rosuvastatin (CRESTOR) 5 MG tablet   See Instructions, TAKE 1 TABLET BY MOUTH EVERY DAY AT BEDTIME, # 90 tab(s), 0 Refill(s), Pharmacy: [id number], 163, cm, Height/Length Dosing, 01/07/21 10:13:00 CST, 83.1, kg, Weight Dosing, 01/07/21 10:13:00 CST       No current facility-administered medications on file prior to visit.       Medications have been reviewed and reconciled with patient at this visit.  Barriers to medications reviewed with patient.    Adverse reactions to current medications reviewed with patient..    Over the counter medications reviewed and reconciled with patient.    Exam:  Wt Readings from Last 3 Encounters:   05/09/22 90.6 kg (199 lb 11.8 oz)   04/20/22 90.5 kg (199 lb 8.3 oz)   01/07/21 83.1 kg (183 lb 3.2 oz)     Temp Readings from Last 3 Encounters:   05/09/22 98.4 °F (36.9 °C) (Temporal)   04/20/22 99 °F (37.2  °C) (Temporal)     BP Readings from Last 3 Encounters:   05/09/22 114/70   04/20/22 124/82   01/07/21 119/81     Pulse Readings from Last 3 Encounters:   05/09/22 101   04/20/22 108     Body mass index is 34.28 kg/m².      Review of Systems   Constitutional: Negative.  Negative for chills and fever.   HENT: Negative.  Negative for congestion, sinus pain and sore throat.    Eyes: Negative for blurred vision and double vision.   Respiratory: Negative for cough, sputum production, shortness of breath and wheezing.    Cardiovascular: Negative for chest pain, palpitations and leg swelling.   Gastrointestinal: Negative for abdominal pain, constipation, diarrhea, heartburn, nausea and vomiting.   Genitourinary: Negative.    Musculoskeletal: Negative.    Skin: Negative.  Negative for rash.   Neurological: Negative.    Endo/Heme/Allergies: Negative.  Negative for polydipsia. Does not bruise/bleed easily.   Psychiatric/Behavioral: Negative for depression and substance abuse.     Physical Exam  Nursing note reviewed.   Cardiovascular:      Rate and Rhythm: Normal rate and regular rhythm.   Pulmonary:      Effort: Pulmonary effort is normal. No respiratory distress.   Neurological:      Mental Status: She is alert and oriented to person, place, and time.   Psychiatric:         Mood and Affect: Mood normal.         Behavior: Behavior normal.         Thought Content: Thought content normal.         Judgment: Judgment normal.         Laboratory Reviewed ({Yes)  Lab Results   Component Value Date    WBC 8.5 11/20/2021    HGB 12.0 11/20/2021    HCT 38.0 11/20/2021    CHOL 215 (H) 11/20/2021    TRIG 103 11/20/2021    HDL 43 11/20/2021    ALT 24 11/20/2021    AST 19 11/20/2021    CREATININE 0.72 11/20/2021    BUN 10.4 11/20/2021    CO2 25 11/20/2021    TSH 1.3780 11/20/2021       Leyla was seen today for establish care.    Diagnoses and all orders for this visit:    Encounter for medical examination to establish care  -      Comprehensive Metabolic Panel; Future  -     Hepatitis C Antibody; Future  -     HIV 1/2 Ag/Ab (4th Gen); Future    Annual physical exam  -     Comprehensive Metabolic Panel; Future  -     Hepatitis C Antibody; Future  -     HIV 1/2 Ag/Ab (4th Gen); Future  -     Lipid Panel; Future    Anxiety state  -     Comprehensive Metabolic Panel; Future    Major depressive disorder with single episode, remission status unspecified  -     Comprehensive Metabolic Panel; Future    Unspecified mood (affective) disorder    Tobacco user  -     Ambulatory referral/consult to Smoking Cessation Program; Future    Other orders  -     rosuvastatin (CRESTOR) 5 MG tablet; Take 1 tablet (5 mg total) by mouth once daily.                Care Plan/Goals: Reviewed    Goals    None         Follow up: Follow up in about 6 months (around 11/9/2022) for Follow up MAYITO, Annual Exam.    After visit summary was printed and given to patient upon discharge today.  Patient goals and care plan are included in After Visit Summary.

## 2022-05-09 NOTE — PROGRESS NOTES
Patient Information  Name: Leyla Brock  : 1985  MRN: [id number]     Referring Physician:  Dr. Jaffe   Primary Care Physician:  Dr. Elda Cruz MD   Date of Visit: 2022      Subjective:       Leyla Brock is a 36 y.o. female who presents for   Chief Complaint   Patient presents with    Mole     On back. No family hx of skin cancer      HPI  Patient here for skin check.     Does patient have a personal hx of skin cancers? no  Does patient have family hx of melanoma?  no  Does patient have hx of strong sun exposure or tanning bed use in the past? no    Patient was last seen:Visit date not found     Prior notes by myself reviewed.   Clinical documentation obtained by nursing staff reviewed.    Review of Systems   Skin: Negative for itching and rash.        Objective:    Physical Exam   Constitutional: She appears well-developed and well-nourished. No distress.   Neurological: She is alert and oriented to person, place, and time. She is not disoriented.   Psychiatric: She has a normal mood and affect.   Skin:   Areas Examined (abnormalities noted in diagram):   Scalp / Hair Palpated and Inspected  Head / Face Inspection Performed  Neck Inspection Performed  Chest / Axilla Inspection Performed  Abdomen Inspection Performed  Genitals / Buttocks / Groin Inspection Performed  Back Inspection Performed  RUE Inspected  LUE Inspection Performed  RLE Inspected  LLE Inspection Performed  Nails and Digits Inspection Performed                       Diagram Legend     Erythematous scaling macule/papule c/w actinic keratosis       Vascular papule c/w angioma      Pigmented verrucoid papule/plaque c/w seborrheic keratosis      Yellow umbilicated papule c/w sebaceous hyperplasia      Irregularly shaped tan macule c/w lentigo     1-2 mm smooth white papules consistent with Milia      Movable subcutaneous cyst with punctum c/w epidermal inclusion cyst      Subcutaneous movable cyst c/w pilar cyst       Firm pink to brown papule c/w dermatofibroma      Pedunculated fleshy papule(s) c/w skin tag(s)      Evenly pigmented macule c/w junctional nevus     Mildly variegated pigmented, slightly irregular-bordered macule c/w mildly atypical nevus      Flesh colored to evenly pigmented papule c/w intradermal nevus       Pink pearly papule/plaque c/w basal cell carcinoma      Erythematous hyperkeratotic cursted plaque c/w SCC      Surgical scar with no sign of skin cancer recurrence      Open and closed comedones      Inflammatory papules and pustules      Verrucoid papule consistent consistent with wart     Erythematous eczematous patches and plaques     Dystrophic onycholytic nail with subungual debris c/w onychomycosis     Umbilicated papule    Erythematous-base heme-crusted tan verrucoid plaque consistent with inflamed seborrheic keratosis     Erythematous Silvery Scaling Plaque c/w Psoriasis     See annotation            [] Data reviewed  [] Independent review of test  [] Management discussed with another provider    Assessment / Plan:      Pathology Orders:     Normal Orders This Visit    Specimen to Pathology, Dermatology     Questions:    Procedure Type: Dermatology and skin neoplasms    Number of Specimens: 3    ------------------------: -------------------------    Spec 1 Procedure: Biopsy    Spec 1 Clinical Impression: junctional nevus, r/o atypia    Spec 1 Source: left upper arm    ------------------------: -------------------------    Spec 2 Procedure: Biopsy    Spec 2 Clinical Impression: junctional nevus, r/o atypia    Spec 2 Source: right upper arm lateral    ------------------------: -------------------------    Spec 3 Procedure: Biopsy    Spec 3 Clinical Impression: junctional nevus, r/o atypia    Spec 3 Source: right upper arm medial    Release to patient: Immediate        Neoplasm of uncertain behavior of skin  -     Specimen to Pathology, Dermatology  Shave biopsy procedure note:    Shave biopsy  performed after verbal consent including risk of infection, scar, recurrence, need for additional treatment of site. Area prepped with alcohol, anesthetized with approximately 1.0cc of 1% lidocaine with epinephrine. Lesional tissues x 3 shaved with dermablade. Hemostasis achieved with application of aluminum chloride. No complications. Dressing applied. Wound care explained.    Solar lentigo  This is a benign hyperpigmented sun induced lesion. Recommend daily sun protection/avoidance and use of at least SPF 30, broad spectrum sunscreen (OTC drug) will reduce the number of new lesions. Treatment of these benign lesions are considered cosmetic.    Multiple benign nevi  Discussed ABCDE's of nevi.  Monitor for new mole or moles that are becoming bigger, darker, irritated, or developing irregular borders. Brochure provided. Instructed patient to observe lesion(s) for changes and follow up in clinic if changes are noted. Patient to monitor skin at home for new or changing lesions.     Dermatofibroma  This is a benign scar-like lesion secondary to minor trauma. No treatment required.              LOS NUMBER AND COMPLEXITY OF PROBLEMS    COMPLEXITY OF DATA RISK TOTAL TIME (m)   30842  17394 [] 1 self-limited or minor problem [x] Minimal to none [] No treatment recommended or patient to monitor 15-29  10-19   99625  95016 Low  [] 2 or > self limited or minor problems  [] 1 stable chronic illness  [] 1 acute, uncomplicated illness or injury Limited (2)  [] Prior external notes from each unique source  [] Review result of each unique test  [] Order each unique test [x]  Low  OTC medications, minor skin biopsy 30-44  20-29   32585  19600 Moderate  []  1 or > chronic illness with progression, exacerbation or SE of treatment  [x]  2 or more stable chronic illnesses  []  1 acute illness with systemic symptoms  []  1 acute complicated injury  []  1 undiagnosed new problem with uncertain prognosis Moderate (1/3 below)  []  3 or more  data items        *Now includes assessment requiring independent historian  []  Independent interpretation of a test  []  Discuss management/test with another provider Moderate  []  Prescription drug mgmt  []  Minor surgery with risk discussed  []  Mgmt limited by social determinates 45-59  30-39   11558  49013 High  []  1 or more chronic illness with severe exacerbation, progression or SE of treatment  []  1 acute or chronic illness/injury that poses a threat to life or bodily function Extensive (2/3 below)  []  3 or more data items        *Now includes assessment requiring independent historian.  []  Independent interpretation of a test  []  Discuss management/test with another provider High  []  Major surgery with risk discussed  []  Drug therapy requiring intensive monitoring for toxicity  []  Hospitalization  []  Decision for DNR 60-74  40-54      No follow-ups on file.    Lenore Luevano MD, FAAD  [facility]

## 2022-05-10 ENCOUNTER — PATIENT MESSAGE (OUTPATIENT)
Dept: INTERNAL MEDICINE | Facility: CLINIC | Age: 37
End: 2022-05-10
Payer: COMMERCIAL

## 2022-05-16 ENCOUNTER — PATIENT MESSAGE (OUTPATIENT)
Dept: DERMATOLOGY | Facility: CLINIC | Age: 37
End: 2022-05-16
Payer: COMMERCIAL

## 2022-05-16 LAB
HCV AB SERPL QL IA: NEGATIVE
HIV 1+2 AB+HIV1 P24 AG SERPL QL IA: NEGATIVE

## 2022-05-20 ENCOUNTER — PATIENT MESSAGE (OUTPATIENT)
Dept: DERMATOLOGY | Facility: CLINIC | Age: 37
End: 2022-05-20
Payer: COMMERCIAL

## 2022-05-20 LAB
FINAL PATHOLOGIC DIAGNOSIS: NORMAL
GROSS: NORMAL
Lab: NORMAL
MICROSCOPIC EXAM: NORMAL

## 2022-05-23 ENCOUNTER — PATIENT MESSAGE (OUTPATIENT)
Dept: INTERNAL MEDICINE | Facility: CLINIC | Age: 37
End: 2022-05-23
Payer: COMMERCIAL

## 2022-05-26 ENCOUNTER — PATIENT MESSAGE (OUTPATIENT)
Dept: INTERNAL MEDICINE | Facility: CLINIC | Age: 37
End: 2022-05-26
Payer: COMMERCIAL

## 2022-05-27 ENCOUNTER — PATIENT MESSAGE (OUTPATIENT)
Dept: INTERNAL MEDICINE | Facility: CLINIC | Age: 37
End: 2022-05-27
Payer: COMMERCIAL

## 2022-06-02 ENCOUNTER — OFFICE VISIT (OUTPATIENT)
Dept: INTERNAL MEDICINE | Facility: CLINIC | Age: 37
End: 2022-06-02
Payer: COMMERCIAL

## 2022-06-02 VITALS — WEIGHT: 200 LBS | BODY MASS INDEX: 34.15 KG/M2 | HEIGHT: 64 IN

## 2022-06-02 DIAGNOSIS — R06.83 SNORES: Primary | ICD-10-CM

## 2022-06-02 DIAGNOSIS — E66.9 OBESITY (BMI 30-39.9): ICD-10-CM

## 2022-06-02 PROCEDURE — 99442 PR PHYSICIAN TELEPHONE EVALUATION 11-20 MIN: ICD-10-PCS | Mod: 95,,, | Performed by: FAMILY MEDICINE

## 2022-06-02 PROCEDURE — 99442 PR PHYSICIAN TELEPHONE EVALUATION 11-20 MIN: CPT | Mod: 95,,, | Performed by: FAMILY MEDICINE

## 2022-06-02 RX ORDER — ROSUVASTATIN CALCIUM 5 MG/1
5 TABLET, COATED ORAL DAILY
Qty: 90 TABLET | Refills: 3 | Status: SHIPPED | OUTPATIENT
Start: 2022-06-02 | End: 2023-03-21 | Stop reason: SDUPTHER

## 2022-06-02 NOTE — PROGRESS NOTES
Leyla Brock  06/02/2022  [id number]    Elda Cruz MD  Patient Care Team:  Elda Cruz MD as PCP - General (Family Medicine)    The patient location is: Home  The chief complaint leading to consultation is: Bariatric Sx    Visit type: audio only    Face to Face time with patient: none  15 minutes of total time spent on the encounter, which includes face to face time and non-face to face time preparing to see the patient (eg, review of tests), Obtaining and/or reviewing separately obtained history, Documenting clinical information in the electronic or other health record, Independently interpreting results (not separately reported) and communicating results to the patient/family/caregiver, or Care coordination (not separately reported).         Each patient to whom he or she provides medical services by telemedicine is:  (1) informed of the relationship between the physician and patient and the respective role of any other health care provider with respect to management of the patient; and (2) notified that he or she may decline to receive medical services by telemedicine and may withdraw from such care at any time.    Notes:       Visit Type:an urgent visit for a new problem    Chief Complaint:   Follow up    History of Present Illness:    She would like to discuss bariatric surgery  She is overweight  She has consulted [facility], waiting to see if she qualifies with her insurance coverage.    She reports her wife does note that she does snore.  Partner she gasp for air.  Herself doesn't really wake up.          Answers for HPI/ROS submitted by the patient on 6/2/2022  activity change: No  unexpected weight change: No  rhinorrhea: No  trouble swallowing: No  visual disturbance: No  chest tightness: No  polyuria: No  difficulty urinating: No  menstrual problem: No  joint swelling: No  arthralgias: No  confusion: No  dysphoric mood: No        History:  Past Medical History:   Diagnosis  Date    Anxiety     Depression      Past Surgical History:   Procedure Laterality Date    BREAST SURGERY      CYST REMOVAL      TONSILLECTOMY      WISDOM TOOTH EXTRACTION       Family History   Problem Relation Age of Onset    Hypertension Mother      Social History     Socioeconomic History    Marital status:    Tobacco Use    Smoking status: Current Every Day Smoker     Packs/day: 0.50     Types: Cigarettes    Smokeless tobacco: Never Used   Substance and Sexual Activity    Alcohol use: Not Currently    Drug use: Never     Patient Active Problem List   Diagnosis    Anxiety state    Major depressive disorder with single episode    COVID-19    Attention deficit hyperactivity disorder (ADHD)    Hyperlipidemia    Mild mood disorder    Obesity    Pilonidal cyst    Tobacco user     Review of patient's allergies indicates:  No Known Allergies    The following were reviewed at this visit: active problem list, medication list, allergies, family history, social history, and health maintenance.    Medications:  Current Outpatient Medications on File Prior to Visit   Medication Sig Dispense Refill    EScitalopram oxalate (LEXAPRO) 10 MG tablet Take 1 tablet (10 mg total) by mouth once daily. 90 tablet 2    OXcarbazepine (TRILEPTAL) 300 MG Tab Take 1 tablet (300 mg total) by mouth 2 (two) times daily. 60 tablet 6    [DISCONTINUED] rosuvastatin (CRESTOR) 5 MG tablet Take 1 tablet (5 mg total) by mouth once daily. 90 tablet 3     No current facility-administered medications on file prior to visit.       Medications have been reviewed and reconciled with patient at this visit.  Barriers to medications reviewed with patient.    Adverse reactions to current medications reviewed with patient..    Over the counter medications reviewed and reconciled with patient.    Exam:  Wt Readings from Last 3 Encounters:   06/02/22 90.7 kg (200 lb)   05/09/22 90.6 kg (199 lb 11.8 oz)   04/20/22 90.5 kg (199 lb 8.3  oz)     Temp Readings from Last 3 Encounters:   05/09/22 98.4 °F (36.9 °C) (Temporal)   04/20/22 99 °F (37.2 °C) (Temporal)     BP Readings from Last 3 Encounters:   05/09/22 114/70   04/20/22 124/82   01/07/21 119/81     Pulse Readings from Last 3 Encounters:   05/09/22 101   04/20/22 108     Body mass index is 34.33 kg/m².      Review of Systems   HENT: Negative for hearing loss.    Eyes: Negative for discharge.   Respiratory: Negative for wheezing.    Cardiovascular: Negative for chest pain and palpitations.   Gastrointestinal: Negative for blood in stool, constipation, diarrhea and vomiting.   Genitourinary: Negative for dysuria and hematuria.   Musculoskeletal: Negative for neck pain.   Neurological: Negative for weakness and headaches.   Endo/Heme/Allergies: Negative for polydipsia.     Physical Exam  Nursing note reviewed.   Pulmonary:      Effort: Pulmonary effort is normal. No respiratory distress.   Neurological:      Mental Status: She is alert and oriented to person, place, and time.   Psychiatric:         Mood and Affect: Mood normal.         Behavior: Behavior normal.         Thought Content: Thought content normal.         Judgment: Judgment normal.         Laboratory Reviewed ({Yes)  Lab Results   Component Value Date    WBC 8.5 11/20/2021    HGB 12.0 11/20/2021    HCT 38.0 11/20/2021     (H) 11/20/2021    CHOL 259 (H) 05/09/2022    TRIG 207 (H) 05/09/2022    HDL 47 05/09/2022    ALT 21 05/09/2022    AST 15 05/09/2022     05/09/2022    K 4.2 05/09/2022     05/09/2022    CREATININE 0.7 05/09/2022    BUN 8 05/09/2022    CO2 27 05/09/2022    TSH 1.3780 11/20/2021       Leyla was seen today for apnea and medication refill.    Diagnoses and all orders for this visit:    Snores  -     Home Sleep Studies; Future    Obesity (BMI 30-39.9)  -     Home Sleep Studies; Future    Other orders  -     rosuvastatin (CRESTOR) 5 MG tablet; Take 1 tablet (5 mg total) by mouth once  daily.                Care Plan/Goals: Reviewed    Goals    None         Follow up: No follow-ups on file.    After visit summary was printed and given to patient upon discharge today.  Patient goals and care plan are included in After Visit Summary.

## 2022-06-03 ENCOUNTER — TELEPHONE (OUTPATIENT)
Dept: PULMONOLOGY | Facility: CLINIC | Age: 37
End: 2022-06-03
Payer: COMMERCIAL

## 2022-06-15 ENCOUNTER — PATIENT MESSAGE (OUTPATIENT)
Dept: INTERNAL MEDICINE | Facility: CLINIC | Age: 37
End: 2022-06-15
Payer: COMMERCIAL

## 2022-09-19 ENCOUNTER — HISTORICAL (OUTPATIENT)
Dept: ADMINISTRATIVE | Facility: HOSPITAL | Age: 37
End: 2022-09-19
Payer: COMMERCIAL

## 2023-03-20 ENCOUNTER — PATIENT MESSAGE (OUTPATIENT)
Dept: INTERNAL MEDICINE | Facility: CLINIC | Age: 38
End: 2023-03-20
Payer: COMMERCIAL

## 2023-03-20 DIAGNOSIS — F32.9 MAJOR DEPRESSIVE DISORDER WITH SINGLE EPISODE, REMISSION STATUS UNSPECIFIED: ICD-10-CM

## 2023-03-20 DIAGNOSIS — F41.1 ANXIETY STATE: ICD-10-CM

## 2023-03-21 ENCOUNTER — PATIENT MESSAGE (OUTPATIENT)
Dept: INTERNAL MEDICINE | Facility: CLINIC | Age: 38
End: 2023-03-21
Payer: COMMERCIAL

## 2023-03-21 RX ORDER — OXCARBAZEPINE 300 MG/1
300 TABLET, FILM COATED ORAL 2 TIMES DAILY
Qty: 180 TABLET | Refills: 0 | Status: SHIPPED | OUTPATIENT
Start: 2023-03-21 | End: 2023-06-28 | Stop reason: SDUPTHER

## 2023-03-21 RX ORDER — ESCITALOPRAM OXALATE 10 MG/1
10 TABLET ORAL DAILY
Qty: 90 TABLET | Refills: 0 | Status: SHIPPED | OUTPATIENT
Start: 2023-03-21 | End: 2023-06-14

## 2023-03-21 RX ORDER — ROSUVASTATIN CALCIUM 5 MG/1
5 TABLET, COATED ORAL DAILY
Qty: 90 TABLET | Refills: 3 | Status: SHIPPED | OUTPATIENT
Start: 2023-03-21 | End: 2023-03-21 | Stop reason: SDUPTHER

## 2023-03-21 RX ORDER — ROSUVASTATIN CALCIUM 5 MG/1
5 TABLET, COATED ORAL DAILY
Qty: 90 TABLET | Refills: 3 | Status: SHIPPED | OUTPATIENT
Start: 2023-03-21

## 2023-06-14 ENCOUNTER — PATIENT MESSAGE (OUTPATIENT)
Dept: INTERNAL MEDICINE | Facility: CLINIC | Age: 38
End: 2023-06-14
Payer: COMMERCIAL

## 2023-06-14 DIAGNOSIS — F32.9 MAJOR DEPRESSIVE DISORDER WITH SINGLE EPISODE, REMISSION STATUS UNSPECIFIED: ICD-10-CM

## 2023-06-14 DIAGNOSIS — F41.1 ANXIETY STATE: ICD-10-CM

## 2023-06-14 RX ORDER — ESCITALOPRAM OXALATE 10 MG/1
TABLET ORAL
Qty: 90 TABLET | Refills: 0 | Status: SHIPPED | OUTPATIENT
Start: 2023-06-14 | End: 2023-06-26 | Stop reason: SDUPTHER

## 2023-06-14 NOTE — TELEPHONE ENCOUNTER
Provider Staff:  Action required for this patient     Please see care gap opportunities below in Care Due Message.    Thanks!  [facility]     Appointments      Date Provider   Last Visit   6/2/2022 Elda Cruz MD   Next Visit   Visit date not found Elda Cruz MD     Refill Decision Note   Leyla Brock  is requesting a refill authorization.  Brief Assessment and Rationale for Refill:  Approve     Medication Therapy Plan:         Comments:     Note composed:8:25 AM 06/14/2023             Appointments     Last Visit   6/2/2022 Elda Cruz MD   Next Visit   Visit date not found Elda Cruz MD

## 2023-06-14 NOTE — TELEPHONE ENCOUNTER
Care Due:                  Date            Visit Type   Department     Provider  --------------------------------------------------------------------------------                                EP -                              PRIMARY      [facility]  Last Visit: 05-      CARE ([facility])   MEDICINE       Elda Cruz  Next Visit: None Scheduled  None         None Found                                                            Last  Test          Frequency    Reason                     Performed    Due Date  --------------------------------------------------------------------------------    Office Visit  12 months..  EScitalopram,              05- 05-                             rosuvastatin.............    CMP.........  12 months..  rosuvastatin.............  05- 05-    Lipid Panel.  12 months..  rosuvastatin.............  05- 05-    [employer] Embedded Care Due Messages. Reference number: [id number].   6/14/2023 7:30:45 AM CDT

## 2023-06-23 ENCOUNTER — PATIENT MESSAGE (OUTPATIENT)
Dept: INTERNAL MEDICINE | Facility: CLINIC | Age: 38
End: 2023-06-23
Payer: COMMERCIAL

## 2023-06-25 DIAGNOSIS — F32.9 MAJOR DEPRESSIVE DISORDER WITH SINGLE EPISODE, REMISSION STATUS UNSPECIFIED: ICD-10-CM

## 2023-06-25 DIAGNOSIS — F41.1 ANXIETY STATE: ICD-10-CM

## 2023-06-26 ENCOUNTER — PATIENT MESSAGE (OUTPATIENT)
Dept: INTERNAL MEDICINE | Facility: CLINIC | Age: 38
End: 2023-06-26
Payer: COMMERCIAL

## 2023-06-26 DIAGNOSIS — F32.9 MAJOR DEPRESSIVE DISORDER WITH SINGLE EPISODE, REMISSION STATUS UNSPECIFIED: ICD-10-CM

## 2023-06-26 DIAGNOSIS — F41.1 ANXIETY STATE: ICD-10-CM

## 2023-06-26 RX ORDER — ESCITALOPRAM OXALATE 10 MG/1
TABLET ORAL
Qty: 30 TABLET | Refills: 0 | Status: SHIPPED | OUTPATIENT
Start: 2023-06-26 | End: 2023-07-25 | Stop reason: SDUPTHER

## 2023-06-26 NOTE — TELEPHONE ENCOUNTER
Patient moved. I sent a message last month stating she was due for an appointment. Please Advise     Last Virtual Visit:6/2/2222  Last office Visit: 5/9/2022

## 2023-06-27 NOTE — TELEPHONE ENCOUNTER
Pt requesting rx refill for attached medication. Rx refill was already sent in on Lexapro yesterday. Please advise.

## 2023-06-28 RX ORDER — OXCARBAZEPINE 300 MG/1
300 TABLET, FILM COATED ORAL 2 TIMES DAILY
Qty: 60 TABLET | Refills: 0 | Status: SHIPPED | OUTPATIENT
Start: 2023-06-28

## 2023-06-28 RX ORDER — OXCARBAZEPINE 300 MG/1
150 TABLET, FILM COATED ORAL 2 TIMES DAILY
Qty: 30 TABLET | Refills: 11 | OUTPATIENT
Start: 2023-06-28 | End: 2024-06-27

## 2023-07-25 ENCOUNTER — PATIENT MESSAGE (OUTPATIENT)
Dept: INTERNAL MEDICINE | Facility: CLINIC | Age: 38
End: 2023-07-25
Payer: COMMERCIAL

## 2023-07-25 DIAGNOSIS — F41.1 ANXIETY STATE: ICD-10-CM

## 2023-07-25 DIAGNOSIS — F32.9 MAJOR DEPRESSIVE DISORDER WITH SINGLE EPISODE, REMISSION STATUS UNSPECIFIED: ICD-10-CM

## 2023-07-25 NOTE — TELEPHONE ENCOUNTER
Dr. Cruz gave her a one month supply last month and told her to follow up with new pcp in the area she moved to

## 2023-07-25 NOTE — TELEPHONE ENCOUNTER
No care due was identified.  [facility] Embedded Care Due Messages. Reference number: [id number].   7/25/2023 4:14:26 PM CDT

## 2023-07-26 RX ORDER — ESCITALOPRAM OXALATE 10 MG/1
TABLET ORAL
Qty: 30 TABLET | Refills: 0 | Status: SHIPPED | OUTPATIENT
Start: 2023-07-26 | End: 2023-08-09

## 2023-08-09 DIAGNOSIS — F41.1 ANXIETY STATE: ICD-10-CM

## 2023-08-09 DIAGNOSIS — F32.9 MAJOR DEPRESSIVE DISORDER WITH SINGLE EPISODE, REMISSION STATUS UNSPECIFIED: ICD-10-CM

## 2023-08-09 RX ORDER — ESCITALOPRAM OXALATE 10 MG/1
TABLET ORAL
Qty: 90 TABLET | Refills: 0 | Status: SHIPPED | OUTPATIENT
Start: 2023-08-09 | End: 2023-09-21

## 2023-08-09 NOTE — TELEPHONE ENCOUNTER
No care due was identified.  [facility] Embedded Care Due Messages. Reference number: [id number].   8/09/2023 4:51:39 PM CDT

## 2023-08-09 NOTE — TELEPHONE ENCOUNTER
Refill Decision Note   Leyla Brock  is requesting a refill authorization.  Brief Assessment and Rationale for Refill:  Approve     Medication Therapy Plan:         Comments:     Note composed:5:17 PM 08/09/2023

## 2023-08-11 ENCOUNTER — LAB VISIT (OUTPATIENT)
Dept: LAB | Facility: HOSPITAL | Age: 38
End: 2023-08-11
Attending: OBSTETRICS & GYNECOLOGY
Payer: COMMERCIAL

## 2023-08-11 DIAGNOSIS — E55.9 AVITAMINOSIS D: ICD-10-CM

## 2023-08-11 DIAGNOSIS — E34.9 ENDOCRINE DISORDER RELATED TO PUBERTY: Primary | ICD-10-CM

## 2023-08-11 LAB
DEPRECATED CALCIDIOL+CALCIFEROL SERPL-MC: 31.4 NG/ML (ref 30–80)
TESTOST SERPL-MCNC: 43.79 NG/DL (ref 13.84–53.35)

## 2023-08-11 PROCEDURE — 82306 VITAMIN D 25 HYDROXY: CPT

## 2023-08-11 PROCEDURE — 84403 ASSAY OF TOTAL TESTOSTERONE: CPT

## 2023-08-11 PROCEDURE — 36415 COLL VENOUS BLD VENIPUNCTURE: CPT

## 2023-08-21 ENCOUNTER — PATIENT MESSAGE (OUTPATIENT)
Dept: FAMILY MEDICINE | Facility: CLINIC | Age: 38
End: 2023-08-21
Payer: COMMERCIAL

## 2023-09-06 RX ORDER — ESCITALOPRAM OXALATE 10 MG/1
10 TABLET ORAL DAILY
Qty: 30 TABLET | Refills: 0 | Status: SHIPPED | OUTPATIENT
Start: 2023-09-06 | End: 2023-09-21 | Stop reason: SDUPTHER

## 2023-09-21 ENCOUNTER — OFFICE VISIT (OUTPATIENT)
Dept: FAMILY MEDICINE | Facility: CLINIC | Age: 38
End: 2023-09-21
Payer: COMMERCIAL

## 2023-09-21 VITALS
SYSTOLIC BLOOD PRESSURE: 101 MMHG | HEIGHT: 64 IN | HEART RATE: 91 BPM | TEMPERATURE: 98 F | BODY MASS INDEX: 34.26 KG/M2 | WEIGHT: 200.69 LBS | DIASTOLIC BLOOD PRESSURE: 71 MMHG | OXYGEN SATURATION: 98 % | RESPIRATION RATE: 18 BRPM

## 2023-09-21 DIAGNOSIS — F33.41 RECURRENT MAJOR DEPRESSIVE DISORDER, IN PARTIAL REMISSION: ICD-10-CM

## 2023-09-21 DIAGNOSIS — Z00.00 ENCOUNTER FOR WELLNESS EXAMINATION: Primary | ICD-10-CM

## 2023-09-21 DIAGNOSIS — F41.1 ANXIETY STATE: ICD-10-CM

## 2023-09-21 DIAGNOSIS — R53.83 FATIGUE, UNSPECIFIED TYPE: ICD-10-CM

## 2023-09-21 DIAGNOSIS — F32.9 MAJOR DEPRESSIVE DISORDER WITH SINGLE EPISODE, REMISSION STATUS UNSPECIFIED: ICD-10-CM

## 2023-09-21 LAB
ALBUMIN SERPL-MCNC: 3.8 G/DL (ref 3.5–5)
ALBUMIN/GLOB SERPL: 1.1 RATIO (ref 1.1–2)
ALP SERPL-CCNC: 72 UNIT/L (ref 40–150)
ALT SERPL-CCNC: 20 UNIT/L (ref 0–55)
APPEARANCE UR: CLEAR
AST SERPL-CCNC: 19 UNIT/L (ref 5–34)
BACTERIA #/AREA URNS AUTO: ABNORMAL /HPF
BASOPHILS # BLD AUTO: 0.04 X10(3)/MCL
BASOPHILS NFR BLD AUTO: 0.4 %
BILIRUB SERPL-MCNC: 0.3 MG/DL
BILIRUB UR QL STRIP.AUTO: NEGATIVE
BUN SERPL-MCNC: 9.6 MG/DL (ref 7–18.7)
CALCIUM SERPL-MCNC: 9.4 MG/DL (ref 8.4–10.2)
CHLORIDE SERPL-SCNC: 106 MMOL/L (ref 98–107)
CHOLEST SERPL-MCNC: 248 MG/DL
CHOLEST/HDLC SERPL: 6 {RATIO} (ref 0–5)
CO2 SERPL-SCNC: 25 MMOL/L (ref 22–29)
COLOR UR: YELLOW
CREAT SERPL-MCNC: 0.76 MG/DL (ref 0.55–1.02)
DEPRECATED CALCIDIOL+CALCIFEROL SERPL-MC: 30.4 NG/ML (ref 30–80)
EOSINOPHIL # BLD AUTO: 0.23 X10(3)/MCL (ref 0–0.9)
EOSINOPHIL NFR BLD AUTO: 2.5 %
ERYTHROCYTE [DISTWIDTH] IN BLOOD BY AUTOMATED COUNT: 14.1 % (ref 11.5–17)
ERYTHROCYTE [SEDIMENTATION RATE] IN BLOOD: 24 MM/HR (ref 0–20)
EST. AVERAGE GLUCOSE BLD GHB EST-MCNC: 108.3 MG/DL
ESTRADIOL SERPL HS-MCNC: 112 PG/ML
FERRITIN SERPL-MCNC: 28.39 NG/ML (ref 4.63–204)
FOLATE SERPL-MCNC: 8.5 NG/ML (ref 7–31.4)
FSH SERPL-ACNC: 3.04 MIU/ML
GFR SERPLBLD CREATININE-BSD FMLA CKD-EPI: >60 MLS/MIN/1.73/M2
GLOBULIN SER-MCNC: 3.6 GM/DL (ref 2.4–3.5)
GLUCOSE SERPL-MCNC: 87 MG/DL (ref 74–100)
GLUCOSE UR QL STRIP.AUTO: NORMAL
HBA1C MFR BLD: 5.4 %
HCT VFR BLD AUTO: 41.2 % (ref 37–47)
HDLC SERPL-MCNC: 41 MG/DL (ref 35–60)
HGB BLD-MCNC: 13.1 G/DL (ref 12–16)
HYALINE CASTS #/AREA URNS LPF: ABNORMAL /LPF
IMM GRANULOCYTES # BLD AUTO: 0.02 X10(3)/MCL (ref 0–0.04)
IMM GRANULOCYTES NFR BLD AUTO: 0.2 %
IRON SATN MFR SERPL: 9 % (ref 20–50)
IRON SERPL-MCNC: 33 UG/DL (ref 50–170)
KETONES UR QL STRIP.AUTO: NEGATIVE
LDLC SERPL CALC-MCNC: 175 MG/DL (ref 50–140)
LEUKOCYTE ESTERASE UR QL STRIP.AUTO: NEGATIVE
LH SERPL-ACNC: 8.63 MIU/ML
LYMPHOCYTES # BLD AUTO: 1.84 X10(3)/MCL (ref 0.6–4.6)
LYMPHOCYTES NFR BLD AUTO: 20.3 %
MCH RBC QN AUTO: 28.5 PG (ref 27–31)
MCHC RBC AUTO-ENTMCNC: 31.8 G/DL (ref 33–36)
MCV RBC AUTO: 89.8 FL (ref 80–94)
MONOCYTES # BLD AUTO: 0.5 X10(3)/MCL (ref 0.1–1.3)
MONOCYTES NFR BLD AUTO: 5.5 %
MUCOUS THREADS URNS QL MICRO: ABNORMAL /LPF
NEUTROPHILS # BLD AUTO: 6.43 X10(3)/MCL (ref 2.1–9.2)
NEUTROPHILS NFR BLD AUTO: 71.1 %
NITRITE UR QL STRIP.AUTO: NEGATIVE
NRBC BLD AUTO-RTO: 0 %
PH UR STRIP.AUTO: 7 [PH]
PLATELET # BLD AUTO: 503 X10(3)/MCL (ref 130–400)
PMV BLD AUTO: 9.9 FL (ref 7.4–10.4)
POTASSIUM SERPL-SCNC: 5.1 MMOL/L (ref 3.5–5.1)
PROGEST SERPL-MCNC: 6.1 NG/ML
PROT SERPL-MCNC: 7.4 GM/DL (ref 6.4–8.3)
PROT UR QL STRIP.AUTO: ABNORMAL
RBC # BLD AUTO: 4.59 X10(6)/MCL (ref 4.2–5.4)
RBC #/AREA URNS AUTO: ABNORMAL /HPF
RBC UR QL AUTO: NEGATIVE
SODIUM SERPL-SCNC: 138 MMOL/L (ref 136–145)
SP GR UR STRIP.AUTO: 1.03 (ref 1–1.03)
SQUAMOUS #/AREA URNS LPF: ABNORMAL /HPF
T4 FREE SERPL-MCNC: 0.98 NG/DL (ref 0.7–1.48)
TIBC SERPL-MCNC: 344 UG/DL (ref 70–310)
TIBC SERPL-MCNC: 377 UG/DL (ref 250–450)
TRANSFERRIN SERPL-MCNC: 342 MG/DL (ref 180–382)
TRIGL SERPL-MCNC: 158 MG/DL (ref 37–140)
TSH SERPL-ACNC: 1.12 UIU/ML (ref 0.35–4.94)
UROBILINOGEN UR STRIP-ACNC: ABNORMAL
VIT B12 SERPL-MCNC: 540 PG/ML (ref 213–816)
VLDLC SERPL CALC-MCNC: 32 MG/DL
WBC # SPEC AUTO: 9.06 X10(3)/MCL (ref 4.5–11.5)
WBC #/AREA URNS AUTO: ABNORMAL /HPF

## 2023-09-21 PROCEDURE — 85652 RBC SED RATE AUTOMATED: CPT

## 2023-09-21 PROCEDURE — 82670 ASSAY OF TOTAL ESTRADIOL: CPT

## 2023-09-21 PROCEDURE — 83036 HEMOGLOBIN GLYCOSYLATED A1C: CPT

## 2023-09-21 PROCEDURE — 83001 ASSAY OF GONADOTROPIN (FSH): CPT

## 2023-09-21 PROCEDURE — 82607 VITAMIN B-12: CPT

## 2023-09-21 PROCEDURE — 80061 LIPID PANEL: CPT

## 2023-09-21 PROCEDURE — 83002 ASSAY OF GONADOTROPIN (LH): CPT

## 2023-09-21 PROCEDURE — 36415 COLL VENOUS BLD VENIPUNCTURE: CPT

## 2023-09-21 PROCEDURE — 99213 OFFICE O/P EST LOW 20 MIN: CPT | Mod: PBBFAC,PN

## 2023-09-21 PROCEDURE — 84144 ASSAY OF PROGESTERONE: CPT

## 2023-09-21 PROCEDURE — 85025 COMPLETE CBC W/AUTO DIFF WBC: CPT

## 2023-09-21 PROCEDURE — 82306 VITAMIN D 25 HYDROXY: CPT

## 2023-09-21 PROCEDURE — 84443 ASSAY THYROID STIM HORMONE: CPT

## 2023-09-21 PROCEDURE — 86039 ANTINUCLEAR ANTIBODIES (ANA): CPT

## 2023-09-21 PROCEDURE — 99395 PREV VISIT EST AGE 18-39: CPT | Mod: S$PBB,,,

## 2023-09-21 PROCEDURE — 81001 URINALYSIS AUTO W/SCOPE: CPT

## 2023-09-21 PROCEDURE — 82728 ASSAY OF FERRITIN: CPT

## 2023-09-21 PROCEDURE — 82746 ASSAY OF FOLIC ACID SERUM: CPT

## 2023-09-21 PROCEDURE — 80053 COMPREHEN METABOLIC PANEL: CPT

## 2023-09-21 PROCEDURE — 83516 IMMUNOASSAY NONANTIBODY: CPT

## 2023-09-21 PROCEDURE — 99395 PR PREVENTIVE VISIT,EST,18-39: ICD-10-PCS | Mod: S$PBB,,,

## 2023-09-21 PROCEDURE — 83540 ASSAY OF IRON: CPT

## 2023-09-21 PROCEDURE — 84439 ASSAY OF FREE THYROXINE: CPT

## 2023-09-21 RX ORDER — ESCITALOPRAM OXALATE 10 MG/1
10 TABLET ORAL DAILY
Qty: 90 TABLET | Refills: 3 | Status: SHIPPED | OUTPATIENT
Start: 2023-09-21 | End: 2024-02-15 | Stop reason: SDUPTHER

## 2023-09-21 NOTE — ASSESSMENT & PLAN NOTE
Continue lexapro 10 mg daily  Read positive daily meditations, avoid negative media, set healthy boundaries.  Exercise daily, keep consistent sleep pattern, eat a healthy diet.  Establish good social support, make changes to reduce stress.  Reports any symptoms of suicidal/homicidal ideations or self harm immediately, if clinic is closed go to nearest emergency room.

## 2023-09-21 NOTE — PROGRESS NOTES
Patient Name: Leyla Brock     : 1985    MRN: [id number]     Subjective:     Patient ID: Leyla Brock is a 37 y.o. female.    Chief Complaint:   Chief Complaint   Patient presents with    [facility] Care        HPI: 2023:  Patient presents to clinic today, recently moved back to [facility] after living in [place] for 1 year.  Was followed by provider there who managed depression and anxiety.  Patient's only current medications are Lexapro 10 mg daily which she is been on for many years with no negative side effects.  Denies SI/HI, brandin or hallucinations.  Patient does endorse chronic fatigue is not sure if something is going on with her has been feeling this way for over a year.  Patient does have 15-year-old child custody before year old boy as well.  Also endorses having for animals that keep her busy. Patient denies chest pain, palpitations, and shortness of breath.  Patient denies fever, night sweats, chills, nausea, vomiting, diarrhea, constipation, weight loss, and changes in appetite.        ROS:       12 point review of systems conducted, negative except as stated in the history of present illness. See HPI for details.    History:     Past Medical History:   Diagnosis Date    Anxiety     Depression         Past Surgical History:   Procedure Laterality Date    BREAST SURGERY      CYST REMOVAL      TONSILLECTOMY      WISDOM TOOTH EXTRACTION         Family History   Problem Relation Age of Onset    Hypertension Mother         Social History     Tobacco Use    Smoking status: Every Day     Current packs/day: 0.50     Types: Cigarettes    Smokeless tobacco: Never   Substance and Sexual Activity    Alcohol use: Not Currently    Drug use: Never    Sexual activity: Not on file       Current Outpatient Medications   Medication Instructions    EScitalopram oxalate (LEXAPRO) 10 mg, Oral, Daily    OXcarbazepine (TRILEPTAL) 300 mg, Oral, 2 times daily    rosuvastatin (CRESTOR) 5 mg, Oral,  "Daily        Review of patient's allergies indicates:  No Known Allergies    Objective:     Visit Vitals  /71 (BP Location: Left arm, Patient Position: Sitting, BP Method: Large (Automatic))   Pulse 91   Temp 98.1 °F (36.7 °C) (Oral)   Resp 18   Ht 5' 4.02" (1.626 m)   Wt 91 kg (200 lb 11.2 oz)   LMP 09/01/2023 (Approximate)   SpO2 98%   BMI 34.43 kg/m²       Physical Examination:     Physical Exam  Vitals reviewed.   Constitutional:       Appearance: Normal appearance. She is normal weight.   HENT:      Head: Normocephalic.      Right Ear: Tympanic membrane, ear canal and external ear normal.      Left Ear: Tympanic membrane, ear canal and external ear normal.      Nose: Nose normal.      Mouth/Throat:      Mouth: Mucous membranes are moist.      Pharynx: Oropharynx is clear.   Eyes:      Extraocular Movements: Extraocular movements intact.      Conjunctiva/sclera: Conjunctivae normal.      Pupils: Pupils are equal, round, and reactive to light.   Cardiovascular:      Rate and Rhythm: Normal rate and regular rhythm.      Pulses: Normal pulses.      Heart sounds: Normal heart sounds.   Pulmonary:      Effort: Pulmonary effort is normal.      Breath sounds: Normal breath sounds.   Abdominal:      General: Abdomen is flat. Bowel sounds are normal.      Palpations: Abdomen is soft.   Musculoskeletal:         General: Normal range of motion.      Cervical back: Normal range of motion and neck supple.   Skin:     General: Skin is warm and dry.   Neurological:      General: No focal deficit present.      Mental Status: She is alert and oriented to person, place, and time.   Psychiatric:         Mood and Affect: Mood normal.         Behavior: Behavior normal.         Lab Results:     Chemistry:  Lab Results   Component Value Date     05/09/2022    K 4.2 05/09/2022    CHLORIDE 107 11/20/2021    BUN 8 05/09/2022    CREATININE 0.7 05/09/2022    GLUCOSE 88 11/20/2021    CALCIUM 9.6 05/09/2022    ALKPHOS 73 " "05/09/2022    LABPROT 6.3 (L) 11/20/2021    ALBUMIN 3.7 05/09/2022    BILIDIR <0.1 11/20/2021    IBILI >0.10 11/20/2021    AST 15 05/09/2022    ALT 21 05/09/2022    [id number] 31.4 08/11/2023    TSH 1.3780 11/20/2021        No results found for: "HGBA1C", "MICROALBCREA"     Hematology:  Lab Results   Component Value Date    WBC 8.5 11/20/2021    HGB 12.0 11/20/2021    HCT 38.0 11/20/2021     (H) 11/20/2021       Lipid Panel:  Lab Results   Component Value Date    CHOL 259 (H) 05/09/2022    HDL 47 05/09/2022    .00 (H) 11/20/2021    TRIG 207 (H) 05/09/2022    TOTALCHOLEST 5.5 (H) 05/09/2022        Urine:  Lab Results   Component Value Date    APPEARANCEUA Clear 11/20/2021    PHUA 6.0 11/20/2021    PROTEINUA Negative 11/20/2021    NITRITESUA Negative 01/07/2021    LEUKOCYTESUR Negative 11/20/2021    RBCUA None Seen 11/20/2021    WBCUA 2-3 11/20/2021    BACTERIA Rare 11/20/2021        Assessment:          ICD-10-CM ICD-9-CM   1. Encounter for wellness examination  Z00.00 V70.0   2. Recurrent major depressive disorder, in partial remission  F33.41 296.35   3. Fatigue, unspecified type  R53.83 780.79   4. Anxiety state  F41.1 300.00   5. Major depressive disorder with single episode, remission status unspecified  F32.9 296.20        Plan:     1. Encounter for wellness examination  -     TSH  -     T4, Free  -     Hemoglobin A1C  -     Lipid Panel  -     CBC Auto Differential  -     Comprehensive Metabolic Panel  -     Vitamin D  -     Urinalysis, Reflex to Urine Culture    2. Recurrent major depressive disorder, in partial remission  Assessment & Plan:  Continue lexapro 10 mg daily  Read positive daily meditations, avoid negative media, set healthy boundaries.  Exercise daily, keep consistent sleep pattern, eat a healthy diet.  Establish good social support, make changes to reduce stress.  Reports any symptoms of suicidal/homicidal ideations or self harm immediately, if clinic is closed go to nearest " emergency room.        3. Fatigue, unspecified type  -     Ferritin  -     Iron and TIBC  -     Vitamin B12  -     Folate  -     BILLY IgG by IFA  -     Sedimentation rate  -     Rheumatoid Factors, IgA, IgG, IgM  -     Estradiol  -     Luteinizing Hormone  -     Progesterone  -     Follicle Stimulating Hormone    4. Anxiety state    5. Major depressive disorder with single episode, remission status unspecified  Assessment & Plan:  Continue lexapro 10 mg daily  Read positive daily meditations, avoid negative media, set healthy boundaries.  Exercise daily, keep consistent sleep pattern, eat a healthy diet.  Establish good social support, make changes to reduce stress.  Reports any symptoms of suicidal/homicidal ideations or self harm immediately, if clinic is closed go to nearest emergency room.      Orders:  -     EScitalopram oxalate (LEXAPRO) 10 MG tablet; Take 1 tablet (10 mg total) by mouth once daily.  Dispense: 90 tablet; Refill: 3         Follow up in about 1 year (around 9/21/2024), or if symptoms worsen or fail to improve, for annual wellness, request pap results from external provider.    Future Appointments   Date Time Provider Department Center   9/23/2024  8:30 AM Tova Stanley NP [facility]        Tova Stanley NP

## 2023-09-22 LAB — ANA SER QL HEP2 SUBST: NORMAL

## 2023-09-25 ENCOUNTER — PATIENT MESSAGE (OUTPATIENT)
Dept: FAMILY MEDICINE | Facility: CLINIC | Age: 38
End: 2023-09-25
Payer: COMMERCIAL

## 2023-09-25 LAB
RF IGA SER-ACNC: <5 UNITS
RF IGG SER-ACNC: <5 UNITS
RF IGM SER-ACNC: <5 UNITS

## 2024-02-12 ENCOUNTER — LAB VISIT (OUTPATIENT)
Dept: LAB | Facility: HOSPITAL | Age: 39
End: 2024-02-12
Attending: OBSTETRICS & GYNECOLOGY
Payer: COMMERCIAL

## 2024-02-12 DIAGNOSIS — Z34.91 FIRST TRIMESTER PREGNANCY: Primary | ICD-10-CM

## 2024-02-12 LAB
ERYTHROCYTE [DISTWIDTH] IN BLOOD BY AUTOMATED COUNT: 15.1 % (ref 11.5–17)
GROUP & RH: NORMAL
HCT VFR BLD AUTO: 38.3 % (ref 37–47)
HGB BLD-MCNC: 12.7 G/DL (ref 12–16)
HIV 1+2 AB+HIV1 P24 AG SERPL QL IA: NONREACTIVE
INDIRECT COOMBS: NORMAL
MCH RBC QN AUTO: 29.5 PG (ref 27–31)
MCHC RBC AUTO-ENTMCNC: 33.2 G/DL (ref 33–36)
MCV RBC AUTO: 89.1 FL (ref 80–94)
NRBC BLD AUTO-RTO: 0 %
PLATELET # BLD AUTO: 449 X10(3)/MCL (ref 130–400)
PMV BLD AUTO: 9.4 FL (ref 7.4–10.4)
RBC # BLD AUTO: 4.3 X10(6)/MCL (ref 4.2–5.4)
SPECIMEN OUTDATE: NORMAL
T PALLIDUM AB SER QL: NONREACTIVE
WBC # SPEC AUTO: 11.78 X10(3)/MCL (ref 4.5–11.5)

## 2024-02-12 PROCEDURE — 87389 HIV-1 AG W/HIV-1&-2 AB AG IA: CPT

## 2024-02-12 PROCEDURE — 36415 COLL VENOUS BLD VENIPUNCTURE: CPT

## 2024-02-12 PROCEDURE — 86762 RUBELLA ANTIBODY: CPT

## 2024-02-12 PROCEDURE — 87086 URINE CULTURE/COLONY COUNT: CPT

## 2024-02-12 PROCEDURE — 85660 RBC SICKLE CELL TEST: CPT

## 2024-02-12 PROCEDURE — 87340 HEPATITIS B SURFACE AG IA: CPT

## 2024-02-12 PROCEDURE — 85027 COMPLETE CBC AUTOMATED: CPT

## 2024-02-12 PROCEDURE — 86850 RBC ANTIBODY SCREEN: CPT | Performed by: OBSTETRICS & GYNECOLOGY

## 2024-02-12 PROCEDURE — 86780 TREPONEMA PALLIDUM: CPT

## 2024-02-13 LAB
HBV SURFACE AG SERPL QL IA: NONREACTIVE
HGB S BLD QL SOLY: NEGATIVE
RUBV IGG SERPL IA-ACNC: 2.1
RUBV IGG SERPL QL IA: POSITIVE

## 2024-02-14 ENCOUNTER — OFFICE VISIT (OUTPATIENT)
Dept: URGENT CARE | Facility: CLINIC | Age: 39
End: 2024-02-14
Payer: COMMERCIAL

## 2024-02-14 VITALS
WEIGHT: 192 LBS | HEART RATE: 95 BPM | SYSTOLIC BLOOD PRESSURE: 123 MMHG | HEIGHT: 64 IN | TEMPERATURE: 98 F | RESPIRATION RATE: 18 BRPM | OXYGEN SATURATION: 97 % | BODY MASS INDEX: 32.78 KG/M2 | DIASTOLIC BLOOD PRESSURE: 82 MMHG

## 2024-02-14 DIAGNOSIS — R09.81 SINUS CONGESTION: Primary | ICD-10-CM

## 2024-02-14 DIAGNOSIS — J06.9 VIRAL URI: ICD-10-CM

## 2024-02-14 LAB
BACTERIA UR CULT: NORMAL
CTP QC/QA: YES
SARS-COV-2 RDRP RESP QL NAA+PROBE: NEGATIVE

## 2024-02-14 PROCEDURE — 87635 SARS-COV-2 COVID-19 AMP PRB: CPT | Mod: QW,,, | Performed by: NURSE PRACTITIONER

## 2024-02-14 PROCEDURE — 99213 OFFICE O/P EST LOW 20 MIN: CPT | Mod: ,,, | Performed by: NURSE PRACTITIONER

## 2024-02-14 NOTE — PROGRESS NOTES
"Subjective:      Patient ID: Leyla Brock is a 38 y.o. female.    Vitals:  height is 5' 4" (1.626 m) and weight is 87.1 kg (192 lb). Her oral temperature is 98.4 °F (36.9 °C). Her blood pressure is 123/82 and her pulse is 95. Her respiration is 18 and oxygen saturation is 97%.     Chief Complaint: Sinus Problem (Sinus congestion, shortness of breathe, cough x 1 day.)    This is a 38-year-old female presents to urgent care with a 2 day history of nasal congestion and runny nose also states feeling slightly short of breath with deep inspiration.  She is currently 6 weeks pregnant and states quitting smoking roughly 5 days ago.  Denies any dizziness weakness nausea vomiting or diarrhea currently.  She states the shortness of breath is just slight difficulty when taking a breath with a congestion.  Denies any other current symptoms.  Was exposed to COVID recently.        HENT:  Positive for congestion and sinus pressure.    Respiratory: Negative.        Objective:     Physical Exam   Constitutional: She is oriented to person, place, and time. She appears well-developed. She is cooperative.  Non-toxic appearance. She does not appear ill. No distress.   HENT:   Head: Normocephalic and atraumatic.   Ears:   Right Ear: Hearing, tympanic membrane, external ear and ear canal normal.   Left Ear: Hearing, tympanic membrane, external ear and ear canal normal.   Nose: Nose normal. No mucosal edema, rhinorrhea or nasal deformity. No epistaxis. Right sinus exhibits no maxillary sinus tenderness and no frontal sinus tenderness. Left sinus exhibits no maxillary sinus tenderness and no frontal sinus tenderness.   Mouth/Throat: Uvula is midline, oropharynx is clear and moist and mucous membranes are normal. No trismus in the jaw. Normal dentition. No uvula swelling. No oropharyngeal exudate, posterior oropharyngeal edema or posterior oropharyngeal erythema.   Eyes: Conjunctivae and lids are normal. No scleral icterus.   Neck: " Trachea normal and phonation normal. Neck supple. No edema present. No erythema present. No neck rigidity present.   Cardiovascular: Normal rate, regular rhythm, normal heart sounds and normal pulses.   Pulmonary/Chest: Effort normal and breath sounds normal. No respiratory distress. She has no decreased breath sounds. She has no rhonchi.   Abdominal: Normal appearance.   Musculoskeletal: Normal range of motion.         General: No deformity. Normal range of motion.   Neurological: She is alert and oriented to person, place, and time. She exhibits normal muscle tone. Coordination normal.   Skin: Skin is warm, dry, intact, not diaphoretic and not pale.   Psychiatric: Her speech is normal and behavior is normal. Judgment and thought content normal.   Nursing note and vitals reviewed.    Negative COVID  Assessment:     1. Sinus congestion    2. Viral URI        Plan:   Advised usage of over-the-counter medication that is clear through her OBGYN for 1st trimester usage to alleviate sinus congestion       Sinus congestion  -     POCT COVID-19 Rapid Screening    Viral URI

## 2024-02-14 NOTE — LETTER
February 14, 2024      [facility]  [id number] [address]  Phone: [phone]  Fax: [phone]       Patient: Leyla Brock   YOB: 1985  Date of Visit: 02/14/2024    To Whom It May Concern:    Ernesto Brock  was at [facility] on 02/14/2024. The patient may return to work/school on 2/19/2024 with no restrictions. If you have any questions or concerns, or if I can be of further assistance, please do not hesitate to contact me.    Sincerely,    Jim Lane NP

## 2024-02-14 NOTE — PATIENT INSTRUCTIONS
Use over-the-counter medication for current symptoms that or cleared 3 year OBGYN for 1st trimester usage   Monitor for any new onset of fever or worsening symptoms please be re-evaluated if this does occur.

## 2024-02-15 DIAGNOSIS — F32.9 MAJOR DEPRESSIVE DISORDER WITH SINGLE EPISODE, REMISSION STATUS UNSPECIFIED: ICD-10-CM

## 2024-02-15 RX ORDER — ESCITALOPRAM OXALATE 10 MG/1
10 TABLET ORAL DAILY
Qty: 90 TABLET | Refills: 3 | Status: SHIPPED | OUTPATIENT
Start: 2024-02-15 | End: 2025-02-14

## 2024-02-15 NOTE — PROGRESS NOTES
I have sent medications and/or lab orders in for this patient.  Please notify the patient.     No orders of the defined types were placed in this encounter.      Medications Ordered This Encounter   Medications    EScitalopram oxalate (LEXAPRO) 10 MG tablet     Sig: Take 1 tablet (10 mg total) by mouth once daily.     Dispense:  90 tablet     Refill:  3